# Patient Record
Sex: MALE | Race: WHITE | NOT HISPANIC OR LATINO | Employment: FULL TIME | URBAN - METROPOLITAN AREA
[De-identification: names, ages, dates, MRNs, and addresses within clinical notes are randomized per-mention and may not be internally consistent; named-entity substitution may affect disease eponyms.]

---

## 2016-12-29 ASSESSMENT — ENCOUNTER SYMPTOMS: ARTHRALGIAS: 1

## 2017-01-12 ENCOUNTER — OFFICE VISIT (OUTPATIENT)
Dept: UROLOGY | Facility: CLINIC | Age: 47
End: 2017-01-12

## 2017-01-12 VITALS
HEIGHT: 77 IN | BODY MASS INDEX: 20.78 KG/M2 | SYSTOLIC BLOOD PRESSURE: 127 MMHG | WEIGHT: 176 LBS | DIASTOLIC BLOOD PRESSURE: 84 MMHG | HEART RATE: 60 BPM

## 2017-01-12 DIAGNOSIS — Z30.09 ENCOUNTER FOR VASECTOMY COUNSELING: Primary | ICD-10-CM

## 2017-01-12 ASSESSMENT — PAIN SCALES - GENERAL: PAINLEVEL: NO PAIN (0)

## 2017-01-12 NOTE — PROGRESS NOTES
"CC: Desires sterilization, consult for vasectomy.  New patient, self referred..    HPI: Kevyn Art is a 46 year old male with no children, and he is intersted in getting a vasectomy for sterilization.      No voiding problems.  No history of  trauma.  No hematuria  Normal sexual function.  No history of bleeding problems.    PMH:   Past Medical History   Diagnosis Date     Atrial fibrillation with RVR (H)    resolved a-fib.    No history of surgery.     FAMILY HX:   Family History   Problem Relation Age of Onset     Breast Cancer Mother 62      No history of coagulopathies, no  malignancies.     ALLERGIES:      Allergies   Allergen Reactions     No Clinical Screening - See Comments      Hay Fever      Diltiazem Rash       MEDS:   Current Outpatient Prescriptions   Medication Sig     aspirin 325 MG tablet Take 1 tablet by mouth daily.     No current facility-administered medications for this visit.       SOCIAL HISTORY:  Social History   Substance Use Topics     Smoking status: Never Smoker      Smokeless tobacco: Never Used     Alcohol Use: Yes      Comment: Social        REVIEW OF SYMPTOMS:   Denies testicular pain, ED, ejaculatory problems, rashes in the groin, easy bruising or bleeding.   No constitutional, eye, ENT, heart, lung, GI, musculoskeletal, skin, neurologic, psychiatric, endocrine or hematologic complaints.       GENERAL PHYSICAL EXAM:   /84 mmHg  Pulse 60  Ht 1.956 m (6' 5\")  Wt 79.833 kg (176 lb)  BMI 20.87 kg/m2     Constitutional: No acute distress. Well nourished.   PSYCH: Normal mood and affect.   NEURO: Normal gait, no focal deficits.   EYES: Anicteric, EOMI, PERR  CARDIOPULMONARY: Breathing non-labored, pulse regular, no peripheral edema.   GI: Abdomen soft, non-tender, surgical scars: none,  no organomegaly.  MUSCULOSKELETAL: Normal limb proportions, no muscle wasting, no contractures.    SKIN: Normal virilized hair distribution, no lesions, warts or rashes over genitalia, " abdomen extremities or face.   HEME/LYMPH: No echymosis, no lymphadenopathy in groin, no lymphedema.     EXAM:  Phallus  circumcised, meatus adequate, no plaques palpated.   Testes descended, consistency is normal. No intra-testicular masses.  Epididymes present.  Vas present bilateraly, both very  easy to find.  CUONG deferred.        I discussed with him at length the risks and benefits of the procedure. He understands that this is a sterilization procedure, and not reversible contraception. He understands that reversals, while possible, are not guaranteed to work and fairly complex. I discussed with him the option of sperm cryopreservation.     I stressed that he continues to be fertile in the post-operative period, and that he should continue using other contraceptive methods, such as a condom, until he obtains a semen analysis and we review the results to confirm success of the procedure and infertility. I also stressed to him that recanalization and pregnancy can occur in about 1 per thousand cases, possibly more even after we clear him with a semen analysis showing no motile sperm. I counseled him on the john-operative risks of bleeding, infection, pain.  I described to him post-vasectomy pain syndrome that can occur in about 1 to 2% of men undergoing vasectomy.     We also discussed recovery times (typically days if no complications) and post-operative care including use of ice packs, pain medication and wound care.    He will think about the above and schedule the procedure if he decides to proceed.

## 2017-01-12 NOTE — PATIENT INSTRUCTIONS
Please follow up with Dr Chavez for your vasectomy. Please stop taking ibuprofen/aspirin 5 days prior and shave your scrotum the night before the procedure     It was a pleasure meeting with you today.  Thank you for allowing me and my team the privilege of caring for you today.  YOU are the reason we are here, and I truly hope we provided you with the excellent service you deserve.  Please let us know if there is anything else we can do for you so that we can be sure you are leaving completely satisfied with your care experience.

## 2017-01-12 NOTE — MR AVS SNAPSHOT
After Visit Summary   1/12/2017    Kevyn Art    MRN: [id number]           Patient Information     Date Of Birth          1970        Visit Information        Provider Department      1/12/2017 7:20 AM Jay Chavez MD [facility]        Today's Diagnoses     Encounter for vasectomy counseling    -  1       Care Instructions    Please follow up with Dr Chavez for your vasectomy. Please stop taking ibuprofen/aspirin 5 days prior and shave your scrotum the night before the procedure     It was a pleasure meeting with you today.  Thank you for allowing me and my team the privilege of caring for you today.  YOU are the reason we are here, and I truly hope we provided you with the excellent service you deserve.  Please let us know if there is anything else we can do for you so that we can be sure you are leaving completely satisfied with your care experience.                Follow-ups after your visit        Follow-up notes from your care team     Return if symptoms worsen or fail to improve.      Your next 10 appointments already scheduled     Feb 02, 2017 10:00 AM   (Arrive by 9:45 AM)   Vasectomy with Jay Chavez MD   [facility] ([facility])    [address] [place] [id number] [phone]              Who to contact     Please call your clinic at [phone] to:    Ask questions about your health    Make or cancel appointments    Discuss your medicines    Learn about your test results    Speak to your doctor   If you have compliments or concerns about an experience at your clinic, or if you wish to file a complaint, please contact [facility] Patient Relations at [phone] or email us at [email or web address]         Additional Information About Your Visit        MyChart Information     MyChart  "gives you secure access to your electronic health record. If you see a primary care provider, you can also send messages to your care team and make appointments. If you have questions, please call your primary care clinic.  If you do not have a primary care provider, please call [phone] and they will assist you.      Scuttledog is an electronic gateway that provides easy, online access to your medical records. With Scuttledog, you can request a clinic appointment, read your test results, renew a prescription or communicate with your care team.     To access your existing account, please contact your [facility] or call [phone] for assistance.        Care EveryWhere ID     This is your Care EveryWhere ID. This could be used by other organizations to access your [place] medical records  [id number]        Your Vitals Were     Pulse Height BMI (Body Mass Index)             60 1.956 m (6' 5\") 20.87 kg/m2          Blood Pressure from Last 3 Encounters:   01/12/17 127/84   02/05/16 132/88   08/19/15 114/77    Weight from Last 3 Encounters:   01/12/17 79.833 kg (176 lb)   02/05/16 81.647 kg (180 lb)   08/19/15 79.833 kg (176 lb)              Today, you had the following     No orders found for display       Primary Care Provider Office Phone # Fax #    Julio César Pia De La Rosa [phone]       [address] [address]        Thank you!     Thank you for choosing [facility] UROLOGIC CANCERS  for your care. Our goal is always to provide you with excellent care. Hearing back from our patients is one way we can continue to improve our services. Please take a few minutes to complete the written survey that you may receive in the mail after your visit with us. Thank you!             Your Updated Medication List - Protect others around you: Learn how to safely use, store and throw away your medicines at " www.disposemymeds.org.          This list is accurate as of: 1/12/17  7:40 AM.  Always use your most recent med list.                   Brand Name Dispense Instructions for use    aspirin 325 MG tablet     100 tablet    Take 1 tablet by mouth daily.

## 2017-01-26 ENCOUNTER — PRE VISIT (OUTPATIENT)
Dept: UROLOGY | Facility: CLINIC | Age: 47
End: 2017-01-26

## 2017-01-26 NOTE — TELEPHONE ENCOUNTER
Left message with patient about vasectomy. No ibuprofen/asiprin 5 days prior and shave scrotum the night before. instructed patient to call if they have questions

## 2017-02-02 ENCOUNTER — OFFICE VISIT (OUTPATIENT)
Dept: UROLOGY | Facility: CLINIC | Age: 47
End: 2017-02-02

## 2017-02-02 VITALS
HEART RATE: 67 BPM | SYSTOLIC BLOOD PRESSURE: 110 MMHG | WEIGHT: 170 LBS | HEIGHT: 77 IN | BODY MASS INDEX: 20.07 KG/M2 | DIASTOLIC BLOOD PRESSURE: 82 MMHG

## 2017-02-02 DIAGNOSIS — Z98.52 STATUS POST VASECTOMY: Primary | ICD-10-CM

## 2017-02-02 RX ORDER — ACETAMINOPHEN AND CODEINE PHOSPHATE 300; 30 MG/1; MG/1
1-2 TABLET ORAL EVERY 4 HOURS PRN
Qty: 20 TABLET | Refills: 0 | Status: SHIPPED | OUTPATIENT
Start: 2017-02-02 | End: 2017-07-22

## 2017-02-02 ASSESSMENT — PAIN SCALES - GENERAL
PAINLEVEL: NO PAIN (0)
PAINLEVEL: NO PAIN (0)

## 2017-02-02 NOTE — PATIENT INSTRUCTIONS
Please complete semen analysis in 12 weeks. Call [phone] to schedule    It was a pleasure meeting with you today.  Thank you for allowing me and my team the privilege of caring for you today.  YOU are the reason we are here, and I truly hope we provided you with the excellent service you deserve.  Please let us know if there is anything else we can do for you so that we can be sure you are leaving completely satisfied with your care experience.

## 2017-02-02 NOTE — NURSING NOTE
Chief Complaint   Patient presents with     Vasectomy     desire for sterilization     Invasive Procedure Safety Checklist:    Procedure: vasectomy    Action: Complete sections and checkboxes as appropriate.    Pre-procedure:  1. Patient ID Verified with 2 identifiers (Trudy and  or MRN) : YES    2. Procedure and site verified with patient/designee (when able) : YES    3. Accurate consent documentation in medical record : YES    4. H&P (or appropriate assessment) documented in medical record : NO  H&P must be up to 30 days prior to procedure an updated within 24 hours of                 Procedure as applicable.     5. Relevant diagnostic and radiology test results appropriately labeled and displayed as applicable : NO    6. Blood products, implants, devices, and/or special equipment available for the procedure as applicable : NO    7. Procedure site(s) marked with provider initials [Exclusions: none] : NO    8. Marking not required. Reason : Yes  Procedure does not require site marking    Time Out:     Time-Out performed immediately prior to starting procedure, including verbal and active participation of all team members addressing: YES    1. Correct patient identity.  2. Confirmed that the correct side and site are marked.  3. An accurate procedure to be done.  4. Agreement on the procedure to be done.  5. Correct patient position.  6. Relevant images and results are properly labeled and appropriately displayed.  7. The need to administer antibiotics or fluids for irrigation purposes during the procedure as applicable.  8. Safety precautions based on patient history or medication use.    During Procedure: Verification of correct person, site, and procedure occurs any time the responsibility for care of the patient is transferred to another member of the care team.    Sky JOHNSON

## 2017-02-02 NOTE — PROGRESS NOTES
Procedure: Vasectomy bilateral.   Anesthesia: Local lidocaine injection by surgeon.  Surgeon: JOSH Chavez M.D.   Assistant:  None, Lora BEY present    Description of procedure: After the patient received education material regarding vasectomy, including risks and benefits, we proceeded with obtaining consent and proceeded with the surgery. He understands that there is a risk of late failure from recanalization. He understands that he is fertile until he has completed one or more semen analyses and he is given clearance from us for unprotected intercourse.  He understands that we will contact regarding the results but it is his responsibility to make sure he is cleared before having unprotected intercourse.  I have discussed with him other risks including bleeding, infection, acute and possible long-term pain.    The right vas was ligated first.  This was done using the standard technique by grasping it with a three-finger  and lifting it up to the skin.  A small amount of lidocaine was infiltrated into the skin and vasal sheath.  The skin was punctured and dilated bluntly using the scalpel-less dissector.  The sheath was identified and a rigid clamp was passed around the vas which was then lifted out of the incision.  The vas was cleaned off from the deferential vessels and the sheath, and cautery was used to divide the vas between mosquitos.  A small segment was removed and discarded.  The lumen of the vas was cannulated to confirm its identity, and the lumens of both ends were cauterized.  Pen cautery was used sparingly/as needed for minor bleeders.  A facial interposition was then performed with a single suture of 4-0 chromic. The skin defect was closed with a 4-0 chromic interrupted suture after confirming adequate hemostasis.       We then turned our attention to the left side and a similar technique was performed. This involved division, excision of a segment, cautery of the lumens, and fascial  interposition.    There were no hematomas noted at the end of the procedure.  The patient tolerated the procedure well.    He was advised to return for a post vasectomy semen check in 3 months, and that he is not sterile and must continue to use contraception until we tell him otherwise (based on follow-up semen specimen(s)).    Plan:  -Post vasectomy semen analysis in 3 months   -ice packs to scrotum X 24h  -keep incision dry X 48h  -Rx for  Tylenol #3

## 2017-02-02 NOTE — Clinical Note
2/2/2017       RE: Kevyn Art  [address] [facility] [address]     Dear Colleague,    Thank you for referring your patient, Kevyn Art, to the [facility] AND UROLOGIC CANCERS at [facility]. Please see a copy of my visit note below.    Procedure: Vasectomy bilateral.   Anesthesia: Local lidocaine injection by surgeon.  Surgeon: JOSH Chavez M.D.   Assistant:  None, Lora BEY present    Description of procedure: After the patient received education material regarding vasectomy, including risks and benefits, we proceeded with obtaining consent and proceeded with the surgery. He understands that there is a risk of late failure from recanalization. He understands that he is fertile until he has completed one or more semen analyses and he is given clearance from us for unprotected intercourse.  He understands that we will contact regarding the results but it is his responsibility to make sure he is cleared before having unprotected intercourse.  I have discussed with him other risks including bleeding, infection, acute and possible long-term pain.    The right vas was ligated first.  This was done using the standard technique by grasping it with a three-finger  and lifting it up to the skin.  A small amount of lidocaine was infiltrated into the skin and vasal sheath.  The skin was punctured and dilated bluntly using the scalpel-less dissector.  The sheath was identified and a rigid clamp was passed around the vas which was then lifted out of the incision.  The vas was cleaned off from the deferential vessels and the sheath, and cautery was used to divide the vas between mosquitos.  A small segment was removed and discarded.  The lumen of the vas was cannulated to confirm its identity, and the lumens of both ends were cauterized.  Pen cautery was used sparingly/as needed for minor bleeders.  A facial interposition was then performed  with a single suture of 4-0 chromic. The skin defect was closed with a 4-0 chromic interrupted suture after confirming adequate hemostasis.       We then turned our attention to the left side and a similar technique was performed. This involved division, excision of a segment, cautery of the lumens, and fascial interposition.    There were no hematomas noted at the end of the procedure.  The patient tolerated the procedure well.    He was advised to return for a post vasectomy semen check in 3 months, and that he is not sterile and must continue to use contraception until we tell him otherwise (based on follow-up semen specimen(s)).    Plan:  -Post vasectomy semen analysis in 3 months   -ice packs to scrotum X 24h  -keep incision dry X 48h  -Rx for  Tylenol #3      Again, thank you for allowing me to participate in the care of your patient.      Sincerely,    Jay Chavez MD

## 2017-03-30 DIAGNOSIS — Z98.52 STATUS POST VASECTOMY: ICD-10-CM

## 2017-03-30 LAB
ABSTINENCE DAYS: 8 DAYS (ref 2–7)
AGGLUTINATION: NORMAL YES/NO
ANALYSIS TEMP - CENTIGRADE: 23 CENTIGRADE
CELL FRAGMENTS: NORMAL %
COLLECTION METHOD: NORMAL
COLLECTION SITE: NORMAL
CONSENT TO RELEASE TO PARTNER: YES
IMMATURE SPERM: NORMAL %
IMMOTILE: 0 %
LAB RECEIPT TIME: NORMAL
LIQUEFIED: YES YES/NO
NON-PROGRESSIVE MOTILITY: 0 %
PROGRESSIVE MOTILITY: 0 % (ref 32–?)
ROUND CELLS: 0 MILLION/ML (ref ?–2)
SPECIMEN CONCENTRATION: 0 MILLION/ML (ref 15–?)
SPECIMEN PH: 7.6 PH (ref 7.2–?)
SPECIMEN TYPE: NORMAL
SPECIMEN VOL UR: 1.5 ML (ref 1.5–?)
TIME OF ANALYSIS: NORMAL
TOTAL NUMBER: 0 MILLION (ref 39–?)
TOTAL PROGRESSIVE MOTILE: 0 MILLION (ref 15.6–?)
VISCOUS: YES YES/NO
WBC SPECIMEN: NORMAL %

## 2017-03-30 PROCEDURE — 89321 SEMEN ANAL SPERM DETECTION: CPT

## 2017-03-31 NOTE — PROGRESS NOTES
Dear Kevyn,     You are cleared for intercourse post-vasectomy.    Your semen analysis showed zero sperm.    Thank You!   Let me know if you have any questions.    Tameka CLAUDIO

## 2017-06-02 ENCOUNTER — OFFICE VISIT (OUTPATIENT)
Dept: URGENT CARE | Facility: URGENT CARE | Age: 47
End: 2017-06-02
Payer: COMMERCIAL

## 2017-06-02 VITALS
OXYGEN SATURATION: 98 % | DIASTOLIC BLOOD PRESSURE: 60 MMHG | HEIGHT: 77 IN | TEMPERATURE: 98.5 F | SYSTOLIC BLOOD PRESSURE: 116 MMHG | BODY MASS INDEX: 20.66 KG/M2 | WEIGHT: 175 LBS | RESPIRATION RATE: 15 BRPM | HEART RATE: 80 BPM
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
## 2017-06-02 PROCEDURE — 99213 OFFICE O/P EST LOW 20 MIN: CPT | Performed by: PHYSICIAN ASSISTANT

## 2017-06-02 RX ORDER — AZITHROMYCIN 250 MG/1
TABLET, FILM COATED ORAL
Qty: 6 TABLET | Refills: 0 | Status: SHIPPED | OUTPATIENT
Start: 2017-06-02 | End: 2017-07-22

## 2017-06-02 NOTE — NURSING NOTE
"Chief Complaint   Patient presents with     Urgent Care     Derm Problem     scratched by his cat this morning, nearl left eye. concern of infection        Initial /60  Pulse 80  Temp 98.5  F (36.9  C) (Oral)  Resp 15  Ht 6' 5\" (1.956 m)  Wt 175 lb (79.4 kg)  SpO2 98%  BMI 20.75 kg/m2 Estimated body mass index is 20.75 kg/(m^2) as calculated from the following:    Height as of this encounter: 6' 5\" (1.956 m).    Weight as of this encounter: 175 lb (79.4 kg).  Medication Reconciliation: complete  "

## 2017-06-02 NOTE — PROGRESS NOTES
"SUBJECTIVE:    Kevyn Art is a 46 year old male who presents today for cat scratch    Nursing Notes:   Amalia Crow, [facility]  6/2/2017  5:18 PM  Signed  Chief Complaint   Patient presents with     Urgent Care     Derm Problem     scratched by his cat this morning, nearl left eye. concern of infection        -was sleeping with his cat next to him when the cat got angry about another cat outside of the window and \"attacked\" this pt.    -he was scratched on the face, over the left eye, and on the nose.    -he has been using sterile dressings on the areas and took a shower right after.    -since the injury this morning, he had noticed pain at the bridge of the nose, has noticed swelling along the nose as well    -pt denies any pain in the eye, no photophobia and no FB sensation    OBJECTIVE:  /60  Pulse 80  Temp 98.5  F (36.9  C) (Oral)  Resp 15  Ht 6' 5\" (1.956 m)  Wt 175 lb (79.4 kg)  SpO2 98%  BMI 20.75 kg/m2    General:  Kevyn is awake, alert, and cooperative.  NAD.  Skin/face:  Obvious facial trauma with superficial scratches to the right cheek.  These are linear and without drainage or swelling.  Above the left eye, along the superior eyelid, there is a small flap laceration without active bleeding.  Some swelling, mild.  No drainage.  This area is slightly tender to palpation.  Bridge of the nose with small 4mm superficial scratch and surrounding edema which is tender.  Swelling extends to the pt's left orbital border.    Eyes: PERRLA, EOMI.  No conjunctival injection    Assessment/Plan:    (T14.8,  W55.03XA) Cat scratch  (primary encounter diagnosis)  Comment:   -discussed low threshold for going to the ED for this type of an injury.  Will start azithromycin (recommended per Up To Date) today  -advised ibuprofen for pain  -if there is ANY sign of fever, worsening swelling or concern for worsening in general, proceed to ED immediately  -f/u with PCP Monday otherwise  Plan: azithromycin " (ZITHROMAX) 250 MG tablet

## 2017-06-02 NOTE — MR AVS SNAPSHOT
"              After Visit Summary   6/2/2017    Kevyn Art    MRN: [id number]           Patient Information     Date Of Birth          1970        Visit Information        Provider Department      6/2/2017 5:15 PM Love Mckinney PA-C [facility]        Today's Diagnoses     Cat scratch    -  1       Follow-ups after your visit        Who to contact     If you have questions or need follow up information about today's clinic visit or your schedule please contact [facility] directly at [phone].  Normal or non-critical lab and imaging results will be communicated to you by [employer], letter or phone within 4 business days after the clinic has received the results. If you do not hear from us within 7 days, please contact the clinic through [employer] or phone. If you have a critical or abnormal lab result, we will notify you by phone as soon as possible.  Submit refill requests through [employer] or call your pharmacy and they will forward the refill request to us. Please allow 3 business days for your refill to be completed.          Additional Information About Your Visit        MyChart Information     Construct gives you secure access to your electronic health record. If you see a primary care provider, you can also send messages to your care team and make appointments. If you have questions, please call your primary care clinic.  If you do not have a primary care provider, please call [phone] and they will assist you.        Care EveryWhere ID     This is your Care EveryWhere ID. This could be used by other organizations to access your [place] medical records  [id number]        Your Vitals Were     Pulse Temperature Respirations Height Pulse Oximetry BMI (Body Mass Index)    80 98.5  F (36.9  C) (Oral) 15 6' 5\" (1.956 m) 98% 20.75 kg/m2       Blood Pressure from Last 3 Encounters:   06/02/17 116/60   02/02/17 110/82   01/12/17 127/84    Weight " from Last 3 Encounters:   06/02/17 175 lb (79.4 kg)   02/02/17 170 lb (77.1 kg)   01/12/17 176 lb (79.8 kg)              Today, you had the following     No orders found for display         Today's Medication Changes          These changes are accurate as of: 6/2/17  5:51 PM.  If you have any questions, ask your nurse or doctor.               Start taking these medicines.        Dose/Directions    azithromycin 250 MG tablet   Commonly known as:  ZITHROMAX   Used for:  Cat scratch   Started by:  Love Mckinney PA-C        Two tablets first day, then one tablet daily for four days.   Quantity:  6 tablet   Refills:  0            Where to get your medicines      These medications were sent to [facility] - [place], [place] - [address]  [address], [place] [address]     Phone:  [phone]     azithromycin 250 MG tablet                Primary Care Provider Office Phone # Fax #    Julio César Pia De La Rosa [phone]       [address] [address]        Thank you!     Thank you for choosing [facility]  for your care. Our goal is always to provide you with excellent care. Hearing back from our patients is one way we can continue to improve our services. Please take a few minutes to complete the written survey that you may receive in the mail after your visit with us. Thank you!             Your Updated Medication List - Protect others around you: Learn how to safely use, store and throw away your medicines at www.disposemymeds.org.          This list is accurate as of: 6/2/17  5:51 PM.  Always use your most recent med list.                   Brand Name Dispense Instructions for use    * acetaminophen-codeine 300-30 MG per tablet    TYLENOL #3    30 tablet    Take 1-2 tablets by mouth every 4 hours as needed for pain maximum 6 tablet(s) per day       * acetaminophen-codeine 300-30 MG per tablet     TYLENOL/codeine #3    20 tablet    Take 1-2 tablets by mouth every 4 hours as needed for mild pain       aspirin 325 MG tablet     100 tablet    Take 1 tablet by mouth daily.       azithromycin 250 MG tablet    ZITHROMAX    6 tablet    Two tablets first day, then one tablet daily for four days.       * Notice:  This list has 2 medication(s) that are the same as other medications prescribed for you. Read the directions carefully, and ask your doctor or other care provider to review them with you.

## 2017-07-22 ENCOUNTER — HOSPITAL ENCOUNTER (EMERGENCY)
Facility: CLINIC | Age: 47
Discharge: HOME | End: 2017-07-22
Attending: FAMILY MEDICINE | Admitting: FAMILY MEDICINE
Payer: COMMERCIAL

## 2017-07-22 ENCOUNTER — NURSE TRIAGE (OUTPATIENT)
Dept: NURSING | Facility: CLINIC | Age: 47
End: 2017-07-22

## 2017-07-22 VITALS
DIASTOLIC BLOOD PRESSURE: 90 MMHG | OXYGEN SATURATION: 98 % | HEART RATE: 57 BPM | TEMPERATURE: 97.8 F | WEIGHT: 169.38 LBS | RESPIRATION RATE: 16 BRPM | SYSTOLIC BLOOD PRESSURE: 129 MMHG | BODY MASS INDEX: 20.08 KG/M2
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
## 2017-07-22 DIAGNOSIS — S61.251A OPEN BITE OF LEFT INDEX FINGER WITHOUT DAMAGE TO NAIL, INITIAL ENCOUNTER: ICD-10-CM

## 2017-07-22 DIAGNOSIS — S61.258A CAT BITE OF INDEX FINGER, INITIAL ENCOUNTER: ICD-10-CM

## 2017-07-22 DIAGNOSIS — S50.812A CAT SCRATCH OF LEFT FOREARM, INITIAL ENCOUNTER: ICD-10-CM
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
## 2017-07-22 PROCEDURE — 25000132 ZZH RX MED GY IP 250 OP 250 PS 637: Performed by: FAMILY MEDICINE

## 2017-07-22 PROCEDURE — 99284 EMERGENCY DEPT VISIT MOD MDM: CPT | Mod: GC | Performed by: FAMILY MEDICINE

## 2017-07-22 PROCEDURE — 99283 EMERGENCY DEPT VISIT LOW MDM: CPT | Performed by: FAMILY MEDICINE

## 2017-07-22 RX ADMIN — AMOXICILLIN AND CLAVULANATE POTASSIUM 1 TABLET: 875; 125 TABLET, FILM COATED ORAL at 09:51

## 2017-07-22 ASSESSMENT — ENCOUNTER SYMPTOMS
TROUBLE SWALLOWING: 0
HEADACHES: 0
WOUND: 1
NERVOUS/ANXIOUS: 0
PALPITATIONS: 0
CONFUSION: 0
CHILLS: 0
FEVER: 0
AGITATION: 0
ADENOPATHY: 0
VOMITING: 0
NAUSEA: 0
SHORTNESS OF BREATH: 0
CHEST TIGHTNESS: 0

## 2017-07-22 NOTE — TELEPHONE ENCOUNTER
"  Reason for Disposition    [1] Cut (length > 1/8 inch or 3 mm) or skin tear AND[2] any animal     \"My cat bit me this morning and I'm not sure if I need stitches or not\".    Additional Information    Negative: [1] Major bleeding (e.g., actively dripping or spurting) AND [2] can't be stopped    Negative: Sounds like a life-threatening emergency to the triager    Negative: Snake bite    Negative: Bite, wound, or sting from fish    Negative: [1] Any break in skin (e.g., cut, puncture or scratch) AND[2] wild animal at risk for RABIES (e.g., bat, raccoon, caputo, skunk, coyote, other carnivores)    Negative: [1] Any break in skin (e.g., cut, puncture or scratch) AND[2] dog, cat, or ferret at risk for RABIES (e.g., sick, stray, unprovoked bite, developing country)    Negative: [1] Any break in skin (e.g., cut, puncture or scratch) AND[2] monkey    Protocols used: ANIMAL BITE-ADULT-      Tala Matamoros RN  [facility]      "

## 2017-07-22 NOTE — DISCHARGE INSTRUCTIONS
Please make an appointment to follow up with Your Primary Care Provider in 7 days if not improving.    Return to the ED with fever, chills, severe pain, redness, and swelling in left hand/arm, or pus coming from wound.        Cat Bite    A cat bite can cause a wound deep enough to break the skin. In such cases, the wound is cleaned and then closed. Sometimes, the wound is not closed completely. This is so that fluid can drain if the wound becomes infected. In addition to wound care, a tetanus shot may be given, if needed.  Home Care    Wash your hands well with soap and warm water before and after caring for the wound. This helps lower the risk of infection.    Care for the wound as directed. If a dressing was applied to the wound, be sure to change it as directed.    If the wound bleeds, place a clean, soft cloth on the wound. Then firmly apply pressure until the bleeding stops. This may take up to 5 minutes. Do not release the pressure and look at the wound during this time.    Most wounds heal within 10 days. But an infection can occur even with proper treatment. So be sure to check the wound daily for signs of infection (see below).    Antibiotics may be prescribed. These help prevent or treat infection. If you re given antibiotics, take them as directed. Also be sure to complete the medications.  Rabies Prevention  Rabies is a virus that can be carried in certain animals. These can include domestic animals such as cats and dogs. Pets fully vaccinated against rabies (2 shots) are at very low risk of infection. But because human rabies is almost always fatal, any biting pet should be confined for 10 days as an extra precaution. In general, if there is a risk for rabies, the following steps may need to be taken:    If someone s pet cat has bitten you, it should be kept in a secure area for the next 10 days to watch for signs of illness. (If the pet owner won t allow this, contact your local animal control center.)  If the cat becomes ill or dies during that time, contact your local animal control center at once so the animal may be tested for rabies. If the cat stays healthy for the next 10 days, there is no danger of rabies in the animal or you.    If a stray cat bit you, contact your local animal control center. They can give information on capture, quarantine, and animal rabies testing.    If you can t locate the animal that bit you in the next 2 days, and if rabies exists in your region, you may need to receive the rabies vaccine series. Call your health care provider right away. Or, return to the emergency department promptly.    All animal bites should be reported to the local animal control center. If you were not given a form to fill out, you can report this yourself.  Follow-up care  Follow up with your health care provider, or as directed.  When to seek medical advice  Call your health care provider right away if any of these occur:    Signs of infection:    Spreading redness or warmth from the wound    Increased pain or swelling    Fever of 100.4 F (38 C) or higher, or as directed by your health care provider    Colored fluid or pus draining from the wound    Signs of rabies infection:    Headache    Confusion    Strange behavior    Seizure    Decreased ability to move any body part near the bite area    Bleeding that cannot be stopped after 5 minutes of firm pressure  Date Last Reviewed: 3/23/2015    4663-3695 [employer]. [address] [place] [address], [place] [address]. All rights reserved. This information is not intended as a substitute for professional medical care. Always follow your healthcare professional's instructions.

## 2017-07-22 NOTE — ED AVS SNAPSHOT
[facility], [place], Emergency Department    [address]    [facility] [address]    Phone:  [phone]    Fax:  [phone]                                       Kevyn Art   MRN: [id number]    Department:  [facility], Emergency Department   Date of Visit:  7/22/2017           After Visit Summary Signature Page     I have received my discharge instructions, and my questions have been answered. I have discussed any challenges I see with this plan with the nurse or doctor.    ..........................................................................................................................................  Patient/Patient Representative Signature      ..........................................................................................................................................  Patient Representative Print Name and Relationship to Patient    ..................................................               ................................................  Date                                            Time    ..........................................................................................................................................  Reviewed by Signature/Title    ...................................................              ..............................................  Date                                                            Time

## 2017-07-22 NOTE — ED PROVIDER NOTES
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 Patients own cat was on lap and when cat saw another cat outside, went to jump up and was restrained by owner and then cat bit patient on left index fingertip involving tip of nail. Also has long scratches on left inner wrist area. Cat had all shots.      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  Bite happened around 7:30am this morning.  This is his third bite from this cat.  His cat saw another cat outside the window, tried to lunge at it, and the patient attempted to restrain the cat.  The cat bit his left index finger and scratched his left forearm.  He washed it under cold water and came to the ED.  He has no fever, chills, minimal throbbing pain, and swelling at this point.  Cat is up to date with all shots, specifically rabies.  Patient has no past medical history of liver dysfunction or asplenia and is current with his tetanus vaccine (2013).    I have reviewed the Medications, Allergies, Past Medical and Surgical History, and Social History in the Epic system.    Review of Systems   Constitutional: Negative for chills and fever.   HENT: Negative for trouble swallowing.    Eyes: Negative for visual disturbance.   Respiratory: Negative for chest tightness and shortness of breath.    Cardiovascular: Negative for chest pain and palpitations.   Gastrointestinal: Negative for nausea and vomiting.   Skin: Positive for wound (left index finger and forearm). Negative for pallor.   Neurological: Negative for headaches.   Hematological: Negative for adenopathy.   Psychiatric/Behavioral: Negative for agitation and confusion. The patient is not nervous/anxious.    All other systems reviewed and are negative.      Physical Exam   BP: (!) 135/95  Heart Rate:  77  Temp: 97.5  F (36.4  C)  Resp: 16  Weight: 76.8 kg (169 lb 6 oz)  SpO2: 100 %  Physical Exam   Constitutional: He is oriented to person, place, and time. He appears well-developed and well-nourished.   HENT:   Head: Normocephalic and atraumatic.   Eyes: Conjunctivae are normal.   Neck: Normal range of motion.   Cardiovascular: Normal rate.    Pulmonary/Chest: Effort normal.   Neurological: He is alert and oriented to person, place, and time.   Skin:        Left index finger has circumferential laceration along half of the nail around the tip of phalanx. Skin well approximated.  Bleeding stopped, minimal swelling, and pain.  Left forearm has 4 linear abrasions due to a cat scratch.  No lymphadenopathy noted.   Psychiatric: He has a normal mood and affect. His behavior is normal. Thought content normal.       ED Course     ED Course   Irrigated laceration and abrasions under cold water for 5 minutes each.  First dose of Augmentin given at 10am.  Steristrips and loose dressing applied.    Procedures            Critical Care time:  none               Labs Ordered and Resulted from Time of ED Arrival Up to the Time of Departure from the ED - No data to display         Assessments & Plan (with Medical Decision Making)   Kevyn Art is a 46 year old male with a laceration of the second phalanx above the DIP joint due to an acute cat bite as well as 4 linear abrasions on his left inner forearm from a cat scratch.  Vital signs stable.  No fever, chills, red streaking of the arm, pus draining from wound, ;ymphadenopathy, or other signs of infection at this time.  Wounds irrigated for 5 minutes under cold water.  No foreign body located.  First dose of amoxicillin-clavulanate 875-125 mg given PO at 10am.  Steristrips and loose dressing applied.  Infection precautions given: return to ED with red streaking down arm, pus coming from wound, swelling or erythema of finger, hand, or arm, lymphadenopathy, or signs of  systemic infection of fever and chills.  Instructed to change dressing when several times today.  Keep finger and arm wounds clean and dry.  Able to remove covering when scab has formed.    I have reviewed the nursing notes.    I have reviewed the findings, diagnosis, plan and need for follow up with the patient.    New Prescriptions    AMOXICILLIN-CLAVULANATE (AUGMENTIN) 875-125 MG PER TABLET    Take 1 tablet by mouth 2 times daily       Final diagnoses:   Cat bite of index finger, initial encounter   Cat scratch of left forearm, initial encounter     I SAW THIS PT INDEPENDENTLY OF   RESIDENT DOCTOR TERESSA.  I FULLY AGREE WITH THE EXAM AND TREATMENT PLAN. IT WAS FULLY DISCUSSED.  HERB MOCK  7/22/2017   [facility], [place], EMERGENCY DEPARTMENT    Cherelle Nguyen, FP1     Jesse [place] MD  08/02/17 [id number]

## 2017-07-22 NOTE — ED AVS SNAPSHOT
[facility], Emergency Department    [address]    [place] [address]    Phone:  [phone]    Fax:  [phone]                                       Kevyn Art   MRN: [id number]    Department:  [facility], Emergency Department   Date of Visit:  7/22/2017           Patient Information     Date Of Birth          1970        Your diagnoses for this visit were:     Cat bite of index finger, initial encounter     Cat scratch of left forearm, initial encounter        You were seen by Jesse [place] MD.        Discharge Instructions       Please make an appointment to follow up with Your Primary Care Provider in 7 days if not improving.    Return to the ED with fever, chills, severe pain, redness, and swelling in left hand/arm, or pus coming from wound.        Cat Bite    A cat bite can cause a wound deep enough to break the skin. In such cases, the wound is cleaned and then closed. Sometimes, the wound is not closed completely. This is so that fluid can drain if the wound becomes infected. In addition to wound care, a tetanus shot may be given, if needed.  Home Care    Wash your hands well with soap and warm water before and after caring for the wound. This helps lower the risk of infection.    Care for the wound as directed. If a dressing was applied to the wound, be sure to change it as directed.    If the wound bleeds, place a clean, soft cloth on the wound. Then firmly apply pressure until the bleeding stops. This may take up to 5 minutes. Do not release the pressure and look at the wound during this time.    Most wounds heal within 10 days. But an infection can occur even with proper treatment. So be sure to check the wound daily for signs of infection (see below).    Antibiotics may be prescribed. These help prevent or treat infection. If you re given antibiotics, take them as directed. Also be sure to complete the medications.  Rabies Prevention  Rabies is a virus that can be carried  in certain animals. These can include domestic animals such as cats and dogs. Pets fully vaccinated against rabies (2 shots) are at very low risk of infection. But because human rabies is almost always fatal, any biting pet should be confined for 10 days as an extra precaution. In general, if there is a risk for rabies, the following steps may need to be taken:    If someone s pet cat has bitten you, it should be kept in a secure area for the next 10 days to watch for signs of illness. (If the pet owner won t allow this, contact your local animal control center.) If the cat becomes ill or dies during that time, contact your local animal control center at once so the animal may be tested for rabies. If the cat stays healthy for the next 10 days, there is no danger of rabies in the animal or you.    If a stray cat bit you, contact your local animal control center. They can give information on capture, quarantine, and animal rabies testing.    If you can t locate the animal that bit you in the next 2 days, and if rabies exists in your region, you may need to receive the rabies vaccine series. Call your health care provider right away. Or, return to the emergency department promptly.    All animal bites should be reported to the local animal control center. If you were not given a form to fill out, you can report this yourself.  Follow-up care  Follow up with your health care provider, or as directed.  When to seek medical advice  Call your health care provider right away if any of these occur:    Signs of infection:    Spreading redness or warmth from the wound    Increased pain or swelling    Fever of 100.4 F (38 C) or higher, or as directed by your health care provider    Colored fluid or pus draining from the wound    Signs of rabies infection:    Headache    Confusion    Strange behavior    Seizure    Decreased ability to move any body part near the bite area    Bleeding that cannot be stopped after 5 minutes of  firm pressure  Date Last Reviewed: 3/23/2015    2612-9519 [employer]. [address] [place] [address], [place] [address]. All rights reserved. This information is not intended as a substitute for professional medical care. Always follow your healthcare professional's instructions.            24 Hour Appointment Hotline       To make an appointment at any [facility] clinic, call [phone] ([phone]). If you don't have a family doctor or clinic, we will help you find one. [facility] are conveniently located to serve the needs of you and your family.             Review of your medicines      START taking        Dose / Directions Last dose taken    amoxicillin-clavulanate 875-125 MG per tablet   Commonly known as:  AUGMENTIN   Dose:  1 tablet   Quantity:  13 tablet        Take 1 tablet by mouth 2 times daily   Refills:  0                Prescriptions were sent or printed at these locations (1 Prescription)                   Other Prescriptions                Printed at Department/Unit printer (1 of 1)         amoxicillin-clavulanate (AUGMENTIN) 875-125 MG per tablet                Orders Needing Specimen Collection     None      Pending Results     No orders found from 7/20/2017 to 7/23/2017.            Pending Culture Results     No orders found from 7/20/2017 to 7/23/2017.            Pending Results Instructions     If you had any lab results that were not finalized at the time of your Discharge, you can call the ED Lab Result RN at [phone]. You will be contacted by this team for any positive Lab results or changes in treatment. The nurses are available 7 days a week from 10A to 6:30P.  You can leave a message 24 hours per day and they will return your call.        Thank you for choosing [facility]       Thank you for choosing [facility] for your care. Our goal is always to provide you with excellent care. Hearing back from our patients is one way we can continue to improve our services. Please  take a few minutes to complete the written survey that you may receive in the mail after you visit with us. Thank you!        EpicPledge Information     [employer] gives you secure access to your electronic health record. If you see a primary care provider, you can also send messages to your care team and make appointments. If you have questions, please call your primary care clinic.  If you do not have a primary care provider, please call [phone] and they will assist you.        Care EveryWhere ID     This is your Care EveryWhere ID. This could be used by other organizations to access your [facility] medical records  [id number]        Equal Access to Services     [facility] Center[facility] : Kristian Moraes, aleyda cast, delia palomares, laura metcalf . So [facility] [phone].    ATENCIÓN: Si habla español, tiene a  disposición servicios gratuitos de asistencia lingüística. Llame al [phone].    We comply with applicable federal civil rights laws and [place] laws. We do not discriminate on the basis of race, color, national origin, age, disability sex, sexual orientation or gender identity.            After Visit Summary       This is your record. Keep this with you and show to your community pharmacist(s) and doctor(s) at your next visit.

## 2017-09-20 ENCOUNTER — OFFICE VISIT (OUTPATIENT)
Dept: FAMILY MEDICINE | Facility: CLINIC | Age: 47
End: 2017-09-20
Payer: COMMERCIAL

## 2017-09-20 VITALS — DIASTOLIC BLOOD PRESSURE: 80 MMHG | TEMPERATURE: 98 F | SYSTOLIC BLOOD PRESSURE: 120 MMHG

## 2017-09-20 DIAGNOSIS — Z23 NEED FOR VACCINATION: ICD-10-CM

## 2017-09-20 DIAGNOSIS — Z71.84 TRAVEL ADVICE ENCOUNTER: Primary | ICD-10-CM

## 2017-09-20 PROCEDURE — 90717 YELLOW FEVER VACCINE SUBQ: CPT | Mod: GA | Performed by: NURSE PRACTITIONER

## 2017-09-20 PROCEDURE — 90471 IMMUNIZATION ADMIN: CPT | Mod: GA | Performed by: NURSE PRACTITIONER

## 2017-09-20 PROCEDURE — 90734 MENACWYD/MENACWYCRM VACC IM: CPT | Mod: GA | Performed by: NURSE PRACTITIONER

## 2017-09-20 PROCEDURE — 90686 IIV4 VACC NO PRSV 0.5 ML IM: CPT | Mod: GA | Performed by: NURSE PRACTITIONER

## 2017-09-20 PROCEDURE — 90636 HEP A/HEP B VACC ADULT IM: CPT | Mod: GA | Performed by: NURSE PRACTITIONER

## 2017-09-20 PROCEDURE — 90472 IMMUNIZATION ADMIN EACH ADD: CPT | Mod: GA | Performed by: NURSE PRACTITIONER

## 2017-09-20 PROCEDURE — 99403 PREV MED CNSL INDIV APPRX 45: CPT | Mod: 25 | Performed by: NURSE PRACTITIONER

## 2017-09-20 RX ORDER — CIPROFLOXACIN 500 MG/1
500 TABLET, FILM COATED ORAL 2 TIMES DAILY
Qty: 6 TABLET | Refills: 0 | Status: SHIPPED | OUTPATIENT
Start: 2017-09-20 | End: 2020-04-23

## 2017-09-20 RX ORDER — ATOVAQUONE AND PROGUANIL HYDROCHLORIDE 250; 100 MG/1; MG/1
1 TABLET, FILM COATED ORAL DAILY
Qty: 30 TABLET | Refills: 0 | Status: SHIPPED | OUTPATIENT
Start: 2017-09-20 | End: 2020-04-23

## 2017-09-20 RX ORDER — ACETAZOLAMIDE 125 MG/1
TABLET ORAL
Qty: 20 TABLET | Refills: 0 | Status: SHIPPED | OUTPATIENT
Start: 2017-09-20 | End: 2020-04-23

## 2017-09-20 NOTE — NURSING NOTE
"Chief Complaint   Patient presents with     [facility]     initial /80  Temp 98  F (36.7  C) (Oral) Estimated body mass index is 20.08 kg/(m^2) as calculated from the following:    Height as of 6/2/17: 6' 5\" (1.956 m).    Weight as of 7/22/17: 169 lb 6 oz (76.8 kg).  BP completed using cuff size: regular.  L  arm      Health Maintenance that is potentially due pending provider review:  NONE    n/a    Sagar Sierra ma  "

## 2017-09-20 NOTE — PATIENT INSTRUCTIONS
Today September 20, 2017 you received the    Twinrix (Hepatitis A & B combo) Vaccine - Please return on 10/20/17 for your 2nd dose and 3/19/18 or later for your 3rd and final dose.    Yellow Fever (YF)    Meningococcal (Menactra) Vaccine    FLU SHOT    Typhoid - Oral. This prescription has been faxed to your pharmacy, please take as directed.   .      10/20/2017    Twin Rene  MMR      Rabies if decided  Days 0,7,21- or 28    These appointments can be made as a NURSE ONLY visit.    **It is very important for the vaccinations to be given on the scheduled day(s), this helps ensure you receive the full effectiveness of the vaccine.**    Please call [facility] with any questions [phone]    Thank you for visiting [facility]

## 2017-09-20 NOTE — MR AVS SNAPSHOT
After Visit Summary   9/20/2017    Kevyn Art    MRN: [id number]           Patient Information     Date Of Birth          1970        Visit Information        Provider Department      9/20/2017 2:15 PM Sharon Christianson APRN CNP [facility]        Today's Diagnoses     Travel advice encounter    -  1    Need for vaccination          Care Instructions    Today September 20, 2017 you received the    Twinrix (Hepatitis A & B combo) Vaccine - Please return on 10/20/17 for your 2nd dose and 3/19/18 or later for your 3rd and final dose.    Yellow Fever (YF)    Meningococcal (Menactra) Vaccine    FLU SHOT    Typhoid - Oral. This prescription has been faxed to your pharmacy, please take as directed.   .      10/20/2017    Twin Rene  MMR      Rabies if decided  Days 0,7,21- or 28    These appointments can be made as a NURSE ONLY visit.    **It is very important for the vaccinations to be given on the scheduled day(s), this helps ensure you receive the full effectiveness of the vaccine.**    Please call [facility] with any questions [phone]    Thank you for visiting [facility]              Follow-ups after your visit        Future tests that were ordered for you today     Open Standing Orders        Priority Remaining Interval Expires Ordered    HEPA/HEPB VACCINE ADULT IM Routine 2/3  8/20/2018 9/20/2017    RABIES VACCINE, IM Routine 3/3  9/20/2018 9/20/2017          Open Future Orders        Priority Expected Expires Ordered    MMR VIRUS IMMUNIZATION, SUBCUT Routine 10/20/2017 9/20/2018 9/20/2017            Who to contact     If you have questions or need follow up information about today's clinic visit or your schedule please contact [facility] directly at [phone].  Normal or non-critical lab and imaging results will be communicated to you by MyChart, letter or phone within 4 business days after the clinic has received the  results. If you do not hear from us within 7 days, please contact the clinic through [employer] or phone. If you have a critical or abnormal lab result, we will notify you by phone as soon as possible.  Submit refill requests through [employer] or call your pharmacy and they will forward the refill request to us. Please allow 3 business days for your refill to be completed.          Additional Information About Your Visit        [employer] Information     [employer] gives you secure access to your electronic health record. If you see a primary care provider, you can also send messages to your care team and make appointments. If you have questions, please call your primary care clinic.  If you do not have a primary care provider, please call [phone] and they will assist you.        Care EveryWhere ID     This is your Care EveryWhere ID. This could be used by other organizations to access your [place] medical records  [id number]        Your Vitals Were     Temperature                   98  F (36.7  C) (Oral)            Blood Pressure from Last 3 Encounters:   09/20/17 120/80   07/22/17 129/90   06/02/17 116/60    Weight from Last 3 Encounters:   07/22/17 169 lb 6 oz (76.8 kg)   06/02/17 175 lb (79.4 kg)   02/02/17 170 lb (77.1 kg)              We Performed the Following     C YELLOW FEVER IMMUNIZATION, LIVE, SQ (STAMARIL)     HC FLU VAC PRESRV FREE QUAD SPLIT VIR 3+YRS IM     MENINGOCOCCAL VACCINE,IM (MENACTRA)          Today's Medication Changes          These changes are accurate as of: 9/20/17  3:51 PM.  If you have any questions, ask your nurse or doctor.               Start taking these medicines.        Dose/Directions    acetaZOLAMIDE 125 MG tablet   Commonly known as:  DIAMOX   Used for:  Travel advice encounter   Started by:  Sharon Christianson, KAVON CNP        Take one- two tab every 12 hours starting 24 hours prior to ascent and continue until decent   Quantity:  20 tablet   Refills:  0        atovaquone-proguanil 250-100 MG per tablet   Commonly known as:  MALARONE   Used for:  Travel advice encounter   Started by:  Sharon Christianson APRN CNP        Dose:  1 tablet   Take 1 tablet by mouth daily Start 2 days before exposure to Malaria and continue daily till  7 days after exposure.   Quantity:  30 tablet   Refills:  0       ciprofloxacin 500 MG tablet   Commonly known as:  CIPRO   Used for:  Travel advice encounter   Started by:  Sharon Christianson APRN CNP        Dose:  500 mg   Take 1 tablet (500 mg) by mouth 2 times daily Take 1 tablet two times a day for up to 3 days for severe diarrhea during travel.   Quantity:  6 tablet   Refills:  0       typhoid CR capsule   Commonly known as:  VIVOTIF   Used for:  Need for vaccination, Travel advice encounter   Started by:  Sharon Christianson APRN CNP        Dose:  1 capsule   Take 1 capsule by mouth every other day   Quantity:  4 capsule   Refills:  0            Where to get your medicines      These medications were sent to [facility] [address] [place] [id number]    Hours:  24-hours Phone:  [phone]     acetaZOLAMIDE 125 MG tablet    atovaquone-proguanil 250-100 MG per tablet    ciprofloxacin 500 MG tablet    typhoid CR capsule                Primary Care Provider Office Phone # Fax #    Julio César Pia De La Rosa [phone] [address] [place] [address]        Equal Access to Services     AHSAN NEW AH: Hadii maren ku hadasho Soomaali, waaxda luqadaha, qaybta kaalmada adeegyada, laura kelsey. So [facility] [phone].    ATENCIÓN: Si habla español, tiene a  disposición servicios gratuitos de asistencia lingüística. Llalexandre al [phone].    We comply with applicable federal civil rights laws and [place] laws. We do not discriminate on the basis of race, color, national origin, age, disability  sex, sexual orientation or gender identity.            Thank you!     Thank you for choosing [facility]  for your care. Our goal is always to provide you with excellent care. Hearing back from our patients is one way we can continue to improve our services. Please take a few minutes to complete the written survey that you may receive in the mail after your visit with us. Thank you!             Your Updated Medication List - Protect others around you: Learn how to safely use, store and throw away your medicines at www.disposemymeds.org.          This list is accurate as of: 9/20/17  3:51 PM.  Always use your most recent med list.                   Brand Name Dispense Instructions for use Diagnosis    acetaZOLAMIDE 125 MG tablet    DIAMOX    20 tablet    Take one- two tab every 12 hours starting 24 hours prior to ascent and continue until decent    Travel advice encounter       atovaquone-proguanil 250-100 MG per tablet    MALARONE    30 tablet    Take 1 tablet by mouth daily Start 2 days before exposure to Malaria and continue daily till  7 days after exposure.    Travel advice encounter       ciprofloxacin 500 MG tablet    CIPRO    6 tablet    Take 1 tablet (500 mg) by mouth 2 times daily Take 1 tablet two times a day for up to 3 days for severe diarrhea during travel.    Travel advice encounter       typhoid CR capsule    VIVOTIF    4 capsule    Take 1 capsule by mouth every other day    Need for vaccination, Travel advice encounter

## 2017-09-20 NOTE — PROGRESS NOTES
Nurse Note      Itinerary:  [facility]      Departure Date: 1/1/18      Return Date: 1/19/18      Length of Trip 18 days      Reason for Travel: Tourism           Urban or rural: both      Accommodations: [facility] (tent)        IMMUNIZATION HISTORY  Have you received any immunizations within the past 4 weeks?  No  Have you ever fainted from having your blood drawn or from an injection?  No  Have you ever had a fever reaction to vaccination?  No  Have you ever had any bad reaction or side effect from any vaccination?  No  Have you ever had hepatitis A or B vaccine?  No  Do you live (or work closely) with anyone who has AIDS, an AIDS-like condition, any other immune disorder or who is on chemotherapy for cancer?  No  Do you have a family history of immunodeficiency?  No  Have you received any injection of immune globulin or any blood products during the past 12 months?  No    Patient roomed by Sagar Merchant  Kevyn Art is a 47 year old male seen today alone for counsultation for international travel to [facility] for Tourism.  Patient will be departing in  5 month(s) and staying for   19 day(s) and  traveling with friends.      Patient itinerary :  will be in the urban and rural region of including a climb on [facility] with sleeping elevations of 12,000 ft max .  Also will be in Malaria risk [place] of [place] and [place] which presents risk for Malaria, Yellow Fever, Dengue Fever, Chikungungya, Zika,  Trypanosomiasis, Schistosomiasis, Rabies, Ebola, food borne illnesses, motor vehicle accidents, Typhoid, Leishmaniasis and Lassa Fever. exposure.      Patient's activities will include sightseeing, safari/game rodriguez, hiking, beach activities (salt water) and high altitude exposure.    Patient's country of birth is [place]    Special medical concerns: none  Pre-travel questionnaire was completed by patient and reviewed by provider.     Vitals: /80  Temp 98  F (36.7  C)  (Oral)  BMI= There is no height or weight on file to calculate BMI.    EXAM:  General:  Well-nourished, well-developed in no acute distress.  Appears to be stated age, interacts appropriately and expresses understanding of information given to patient.    No current outpatient prescriptions on file.     Patient Active Problem List   Diagnosis     Atrial fibrillation (H)     CARDIOVASCULAR SCREENING; LDL GOAL LESS THAN 160     Alcohol use     Allergies   Allergen Reactions     No Clinical Screening - See Comments      Hay Fever      Zithromax [Azithromycin]      depression     Diltiazem Rash         Immunizations discussed include:   Hepatitis A:  Twin Rene series started today  Hepatitis B: Twin Rene series started today  Influenza: Ordered/given today, risks, benefits and side effects reviewed  Typhoid: Oral Typhoid (Vivotiff) Rx sent to pharmacy  Rabies: future order placed and patient will research further  Yellow Fever: Stamaril Ordered/given today - consent completed, side effects, precautions, allergies, risks discussed. Patient expressed understanding.  Lithuanian Encephalitis: Not indicated  Meningococcus: Ordered/given today, risks, benefits and side effects reviewed  Tetanus/Diphtheria: Up to date  Measles/Mumps/Rubella: future order placed for 1 month from today  Cholera: Not needed  Polio: Up to date  Pneumococcal: Under age of 65  Varicella: Immune by disease history per patient report  Zostavax:  Not indicated  HPV:  Not indicated  TB:  Low risk itinerary    Stamaril Informed Consent    The patient was provided with a copy of the IRB-approved consent form and all questions were answered before the patient agreed to participate by signing the informed consent document.   A copy of the form was provided to the patient.    Date: 9/20/2017  Consent Version Date: 5/10/2017  Consent Obtained by:  Sofia  HIPAA:  Yes  HIPAA Authorization Signed Date: 9/20/2017      Inclusion/Exclusion Criteria:    (Similar to  Yellow Fever-VAX)      The patient met all of the following inclusion criteria in order to be eligible for the Stamaril vaccination under this EAP (Expanded Access Investigational New Drug Program)           At increased risk for YF, including researchers, laboratory workers, vaccine production staff, and those who are traveling within 30 days to a YF-endemic region or to a country requiring proof of YF vaccination under IHRs (International Health Regulations)?       Yes     Patient is greater than or equal to 9 months of age on the day of vaccination?     Yes     Patient is greater than or equal to 18 years of age and signed and dated the Consent Forms?     Yes     Patient is < 18 years of age and parent(s)/guardian(s) signed and dated the Consent Forms?      Patient is 7 years to < 18 years of age and signed and dated the Assent form?        No Assent is required.  Patient is <7 years of age.     na      No      N/A     The patient did not meet any of the following criteria that would have excluded the patient from receiving the Stamaril vaccination under this EAP              Patient is less than 9 months of age.       No     The patient is breast-feeding and cannot stop nursing for at least 14 days after vaccination.    Note: Yellow Fever vaccine virus may be transmissible via breast milk by nursing mothers who are vaccinated during the final 2 weeks of pregnancy or post-partum.   Following transmission, infants may develop encephalitis.  The minimum time of discontinuation of breastfeeding for 14 days after vaccination is based on the expected clearance of live-attenuated vaccine virus.       No     The patient is immunosuppressed, whether congenital or idiopathic, including for example, leukemia, lymphoma, other malignancies, and patients who are receiving immunosuppressant medications (e.g. Systemic corticosteroids [greater than the standard dose of topical or inhaled steroids], alkylating drugs,  antimetabolites, of other cytotoxic or immunomodulatory drugs) or radiation therapy or organ transplantation.       No     The patient has known hypersensitivity to the active substance or to any of the excipients of Stamaril vaccine or to eggs or chicken proteins.     No     The patient is symptomatic for human immunodeficiency virus (HIV) infection     No     The patient is asymptomatic for HIV infection but accompanied by evidence of severe immune suppression    Note:  Evidence of severe immune suppression includes CD4+ T-cell counts < 200 cubic millimeters (or < 15% total lymphocytes in children aged < 6 years), or as determined by the health care provider.       Yes     The patient has a history of thymus dysfunction (including myasthenia gravis, thymoma, thymectomy)     No     Moderate or severe febrile illness or acute illness    Note: Participation in the EAP can be reassessed when moderate or severe febrile illness or acute illness has resolved.       No         Altitude Exposure on this trip: YES  Past tolerance to Altitude: Limited ( 9,000)    ASSESSMENT/PLAN:    ICD-10-CM    1. Travel advice encounter Z71.89 C YELLOW FEVER IMMUNIZATION, LIVE, SQ (STAMARIL)     HC FLU VAC PRESRV FREE QUAD SPLIT VIR 3+YRS IM     HEPA/HEPB VACCINE ADULT IM     MMR VIRUS IMMUNIZATION, SUBCUT     RABIES VACCINE, IM     MENINGOCOCCAL VACCINE,IM (MENACTRA)     typhoid (VIVOTIF) CR capsule     HEPA/HEPB VACCINE ADULT IM     atovaquone-proguanil (MALARONE) 250-100 MG per tablet     acetaZOLAMIDE (DIAMOX) 125 MG tablet     ciprofloxacin (CIPRO) 500 MG tablet   2. Need for vaccination Z23 C YELLOW FEVER IMMUNIZATION, LIVE, SQ (STAMARIL)     HC FLU VAC PRESRV FREE QUAD SPLIT VIR 3+YRS IM     HEPA/HEPB VACCINE ADULT IM     MMR VIRUS IMMUNIZATION, SUBCUT     RABIES VACCINE, IM     MENINGOCOCCAL VACCINE,IM (MENACTRA)     typhoid (VIVOTIF) CR capsule     HEPA/HEPB VACCINE ADULT IM     I have reviewed general recommendations for safe  travel   including: food/water precautions, insect precautions, safer sex   practices given high prevalence of Zika, HIV and other STDs,   roadway safety. Educational materials and [employer] report provided.    Malaraia prophylaxis recommended: Malarone  Symptomatic treatment for traveler's diarrhea: ciprofloxacin (allergic to Zithro)  Altitude illness prevention and treatment: Diamox with patient instructions on altitude illness prevention and early recognition      Evacuation insurance advised and resources were provided to patient.    Total visit time 45 minutes  with over 50% of time spent counseling patient as detailed above.    Sharon Christianson CNP

## 2017-11-09 ENCOUNTER — ALLIED HEALTH/NURSE VISIT (OUTPATIENT)
Dept: NURSING | Facility: CLINIC | Age: 47
End: 2017-11-09
Payer: COMMERCIAL

## 2017-11-09 DIAGNOSIS — Z71.84 TRAVEL ADVICE ENCOUNTER: ICD-10-CM

## 2017-11-09 DIAGNOSIS — Z23 NEED FOR VACCINATION: ICD-10-CM

## 2017-11-09 PROCEDURE — 99207 ZZC NO CHARGE NURSE ONLY: CPT

## 2017-11-09 PROCEDURE — 90707 MMR VACCINE SC: CPT | Mod: GA

## 2017-11-09 PROCEDURE — 90636 HEP A/HEP B VACC ADULT IM: CPT | Mod: GA

## 2017-11-09 NOTE — NURSING NOTE
Chief Complaint   Patient presents with     Allied Health Visit     Screening Questionnaire for Adult Immunization    Are you sick today?   No   Do you have allergies to medications, food, a vaccine component or latex?   Yes-documented   Have you ever had a serious reaction after receiving a vaccination?   No   Do you have a long-term health problem with heart disease, lung disease, asthma, kidney disease, metabolic disease (e.g. diabetes), anemia, or other blood disorder?   No   Do you have cancer, leukemia, HIV/AIDS, or any other immune system problem?   No   In the past 3 months, have you taken medications that affect  your immune system, such as prednisone, other steroids, or anticancer drugs; drugs for the treatment of rheumatoid arthritis, Crohn s disease, or psoriasis; or have you had radiation treatments?   No   Have you had a seizure, or a brain or other nervous system problem?   No   During the past year, have you received a transfusion of blood or blood     products, or been given immune (gamma) globulin or antiviral drug?   No   For women: Are you pregnant or is there a chance you could become        pregnant during the next month?   No   Have you received any vaccinations in the past 4 weeks?   Yes-travel vaccines     Immunization questionnaire was positive for at least one answer.  Sharon Christianson CNP aware.        Per orders of Sharon Christianson CNP, injection of Twinrix and MMR given by Camilla Gamino. Patient instructed to remain in clinic for 15 minutes afterwards, and to report any adverse reaction to me immediately.       Screening performed by Camilla Gamino on 11/9/2017 at 2:09 PM.

## 2017-11-09 NOTE — MR AVS SNAPSHOT
After Visit Summary   11/9/2017    Kevyn Art    MRN: [id number]           Patient Information     Date Of Birth          1970        Visit Information        Provider Department      11/9/2017 1:00 PM UP NURSE [place] Nurse        Today's Diagnoses     Need for vaccination        Travel advice encounter           Follow-ups after your visit        Your next 10 appointments already scheduled     Mar 09, 2018  1:00 PM CST   Nurse Only with UP NURSE   [place] Nurse ([facility])    [address]  [facility] [address] [phone]              Who to contact     If you have questions or need follow up information about today's clinic visit or your schedule please contact [facility] directly at [phone].  Normal or non-critical lab and imaging results will be communicated to you by MyChart, letter or phone within 4 business days after the clinic has received the results. If you do not hear from us within 7 days, please contact the clinic through [employer] or phone. If you have a critical or abnormal lab result, we will notify you by phone as soon as possible.  Submit refill requests through [email or web address] or call your pharmacy and they will forward the refill request to us. Please allow 3 business days for your refill to be completed.          Additional Information About Your Visit        MyChart Information     [email or web address] gives you secure access to your electronic health record. If you see a primary care provider, you can also send messages to your care team and make appointments. If you have questions, please call your primary care clinic.  If you do not have a primary care provider, please call [phone] and they will assist you.        Care EveryWhere ID     This is your Care EveryWhere ID. This could be used by other organizations to access your [place] medical records  [id number]         Blood Pressure from Last 3 Encounters:    09/20/17 120/80   07/22/17 129/90   06/02/17 116/60    Weight from Last 3 Encounters:   07/22/17 169 lb 6 oz (76.8 kg)   06/02/17 175 lb (79.4 kg)   02/02/17 170 lb (77.1 kg)              We Performed the Following     HEPA/HEPB VACCINE ADULT IM     MMR VIRUS IMMUNIZATION, SUBCUT        Primary Care Provider Office Phone # Fax #    Julio César Pia De La Rosa [phone] [address] [place] [address]        Equal Access to Services     [facility]: Hadii aad ku hadasho Somari, waaxda luqadaha, qaybta kaalmajarad palomares, laura metcalf . So [facility] [phone].    ATENCIÓN: Si habla español, tiene a  disposición servicios gratuitos de asistencia lingüística. [facility] [phone].    We comply with applicable federal civil rights laws and [place] laws. We do not discriminate on the basis of race, color, national origin, age, disability, sex, sexual orientation, or gender identity.            Thank you!     Thank you for choosing [facility]  for your care. Our goal is always to provide you with excellent care. Hearing back from our patients is one way we can continue to improve our services. Please take a few minutes to complete the written survey that you may receive in the mail after your visit with us. Thank you!             Your Updated Medication List - Protect others around you: Learn how to safely use, store and throw away your medicines at www.disposemymeds.org.          This list is accurate as of: 11/9/17  1:44 PM.  Always use your most recent med list.                   Brand Name Dispense Instructions for use Diagnosis    acetaZOLAMIDE 125 MG tablet    DIAMOX    20 tablet    Take one- two tab every 12 hours starting 24 hours prior to ascent and continue until decent    Travel advice encounter       atovaquone-proguanil 250-100 MG per tablet    MALARONE    30 tablet    Take 1 tablet by mouth daily Start 2 days before exposure to  Malaria and continue daily till  7 days after exposure.    Travel advice encounter       ciprofloxacin 500 MG tablet    CIPRO    6 tablet    Take 1 tablet (500 mg) by mouth 2 times daily Take 1 tablet two times a day for up to 3 days for severe diarrhea during travel.    Travel advice encounter       typhoid CR capsule    VIVOTIF    4 capsule    Take 1 capsule by mouth every other day    Need for vaccination, Travel advice encounter

## 2017-12-12 ENCOUNTER — TELEPHONE (OUTPATIENT)
Dept: FAMILY MEDICINE | Facility: CLINIC | Age: 47
End: 2017-12-12

## 2017-12-12 DIAGNOSIS — Z23 NEED FOR VACCINATION: ICD-10-CM

## 2017-12-12 DIAGNOSIS — Z71.84 TRAVEL ADVICE ENCOUNTER: Primary | ICD-10-CM

## 2017-12-12 NOTE — TELEPHONE ENCOUNTER
Reason for Call: Request for an order or referral:    Order or referral being requested: thphoid pt decided he wants to do they shot instead of oral    Date needed: as soon as possible    Has the patient been seen by the PCP for this problem? YES    Additional comments:     Phone number Patient can be reached at:  Home number on file [phone] (home)    Best Time:  any    Can we leave a detailed message on this number?  YES    Call taken on 12/12/2017 at 11:12 AM by Sheila Crow

## 2017-12-12 NOTE — TELEPHONE ENCOUNTER
Patient did not receive Oral Typhoid from pharmacy and would now like a shot.  Typhoid IM ordered and patient will call to schedule a nurse visit.  Sharon Christianson CNP

## 2017-12-18 ENCOUNTER — ALLIED HEALTH/NURSE VISIT (OUTPATIENT)
Dept: NURSING | Facility: CLINIC | Age: 47
End: 2017-12-18
Payer: COMMERCIAL

## 2017-12-18 DIAGNOSIS — Z23 NEED FOR VACCINATION: ICD-10-CM

## 2017-12-18 PROCEDURE — 90471 IMMUNIZATION ADMIN: CPT | Mod: GA

## 2017-12-18 PROCEDURE — 99207 ZZC NO CHARGE LOS: CPT

## 2017-12-18 PROCEDURE — 90691 TYPHOID VACCINE IM: CPT | Mod: GA

## 2017-12-18 NOTE — PROGRESS NOTES
Screening Questionnaire for Adult Immunization    Are you sick today?   No   Do you have allergies to medications, food, a vaccine component or latex?   No   Have you ever had a serious reaction after receiving a vaccination?   No   Do you have a long-term health problem with heart disease, lung disease, asthma, kidney disease, metabolic disease (e.g. diabetes), anemia, or other blood disorder?   No   Do you have cancer, leukemia, HIV/AIDS, or any other immune system problem?   No   In the past 3 months, have you taken medications that affect  your immune system, such as prednisone, other steroids, or anticancer drugs; drugs for the treatment of rheumatoid arthritis, Crohn s disease, or psoriasis; or have you had radiation treatments?   No   Have you had a seizure, or a brain or other nervous system problem?   No   During the past year, have you received a transfusion of blood or blood     products, or been given immune (gamma) globulin or antiviral drug?   No   For women: Are you pregnant or is there a chance you could become        pregnant during the next month?   No   Have you received any vaccinations in the past 4 weeks?   No     Immunization questionnaire answers were all negative.      Prior to injection verified patient identity using patient's name and date of birth.    Per orders of VILMA Christianson, injection of Typhoid given by Natty Shanks. Patient instructed to remain in clinic for 15 minutes afterwards, and to report any adverse reaction to me immediately.       Screening performed by Natty Shanks on 12/18/2017 at 9:19 AM.

## 2017-12-18 NOTE — NURSING NOTE
"Chief Complaint   Patient presents with     Allied Health Visit     Imm/Inj     Typhoid      There were no vitals taken for this visit. Estimated body mass index is 20.08 kg/(m^2) as calculated from the following:    Height as of 6/2/17: 6' 5\" (1.956 m).    Weight as of 7/22/17: 169 lb 6 oz (76.8 kg).  bp completed using cuff size: NA (Not Taken)       Health Maintenance addressed:  NONE    n/a    Natty Shanks MA     "

## 2017-12-18 NOTE — MR AVS SNAPSHOT
After Visit Summary   12/18/2017    Kevyn Art    MRN: [id number]           Patient Information     Date Of Birth          1970        Visit Information        Provider Department      12/18/2017 9:00 AM UP NURSE [facility]        Today's Diagnoses     Need for vaccination           Follow-ups after your visit        Your next 10 appointments already scheduled     Mar 09, 2018  1:00 PM CST   Nurse Only with UP NURSE   [facility] ([facility])    [address] [place]  [facility] [address] [phone]              Who to contact     If you have questions or need follow up information about today's clinic visit or your schedule please contact [facility] directly at [phone].  Normal or non-critical lab and imaging results will be communicated to you by MyChart, letter or phone within 4 business days after the clinic has received the results. If you do not hear from us within 7 days, please contact the clinic through MyChart or phone. If you have a critical or abnormal lab result, we will notify you by phone as soon as possible.  Submit refill requests through [email or web address] or call your pharmacy and they will forward the refill request to us. Please allow 3 business days for your refill to be completed.          Additional Information About Your Visit        MyChart Information     [email or web address] gives you secure access to your electronic health record. If you see a primary care provider, you can also send messages to your care team and make appointments. If you have questions, please call your primary care clinic.  If you do not have a primary care provider, please call [phone] and they will assist you.        Care EveryWhere ID     This is your Care EveryWhere ID. This could be used by other organizations to access your [place] medical records  [id number]         Blood Pressure from Last 3 Encounters:   09/20/17 120/80   07/22/17 129/90    06/02/17 116/60    Weight from Last 3 Encounters:   07/22/17 169 lb 6 oz (76.8 kg)   06/02/17 175 lb (79.4 kg)   02/02/17 170 lb (77.1 kg)              We Performed the Following     TYPHOID VACCINE, IM        Primary Care Provider Office Phone # Fax #    Julio César Pia De La Rosa [phone] [address] [place] [address]        Equal Access to Services     AHSAN NEW : Hadii aad ku hadasho Soomaali, waaxda luqadaha, qaybta kaalmada adeegyada, waxay idiin hayaan adeeg kharafab labelinda kelsey. So [facility] [phone].    ATENCIÓN: Si habla español, tiene a  disposición servicios gratuitos de asistencia lingüística. [facility] [phone].    We comply with applicable federal civil rights laws and [place] laws. We do not discriminate on the basis of race, color, national origin, age, disability, sex, sexual orientation, or gender identity.            Thank you!     Thank you for choosing [facility] NURSE  for your care. Our goal is always to provide you with excellent care. Hearing back from our patients is one way we can continue to improve our services. Please take a few minutes to complete the written survey that you may receive in the mail after your visit with us. Thank you!             Your Updated Medication List - Protect others around you: Learn how to safely use, store and throw away your medicines at www.disposemymeds.org.          This list is accurate as of: 12/18/17  9:22 AM.  Always use your most recent med list.                   Brand Name Dispense Instructions for use Diagnosis    acetaZOLAMIDE 125 MG tablet    DIAMOX    20 tablet    Take one- two tab every 12 hours starting 24 hours prior to ascent and continue until decent    Travel advice encounter       atovaquone-proguanil 250-100 MG per tablet    MALARONE    30 tablet    Take 1 tablet by mouth daily Start 2 days before exposure to Malaria and continue daily till  7 days after exposure.    Travel advice encounter        ciprofloxacin 500 MG tablet    CIPRO    6 tablet    Take 1 tablet (500 mg) by mouth 2 times daily Take 1 tablet two times a day for up to 3 days for severe diarrhea during travel.    Travel advice encounter       typhoid CR capsule    VIVOTIF    4 capsule    Take 1 capsule by mouth every other day    Need for vaccination, Travel advice encounter

## 2018-07-18 ENCOUNTER — RADIANT APPOINTMENT (OUTPATIENT)
Dept: GENERAL RADIOLOGY | Facility: CLINIC | Age: 48
End: 2018-07-18
Attending: FAMILY MEDICINE
Payer: COMMERCIAL

## 2018-07-18 ENCOUNTER — OFFICE VISIT (OUTPATIENT)
Dept: ORTHOPEDICS | Facility: CLINIC | Age: 48
End: 2018-07-18
Payer: COMMERCIAL

## 2018-07-18 VITALS
DIASTOLIC BLOOD PRESSURE: 81 MMHG | SYSTOLIC BLOOD PRESSURE: 123 MMHG | WEIGHT: 175 LBS | HEIGHT: 77 IN | BODY MASS INDEX: 20.66 KG/M2

## 2018-07-18 DIAGNOSIS — M79.674 PAIN OF TOE OF RIGHT FOOT: Primary | ICD-10-CM

## 2018-07-18 DIAGNOSIS — S92.502A CLOSED FRACTURE OF PHALANX OF LEFT THIRD TOE, INITIAL ENCOUNTER: ICD-10-CM

## 2018-07-18 DIAGNOSIS — M79.674 PAIN OF TOE OF RIGHT FOOT: ICD-10-CM

## 2018-07-18 NOTE — MR AVS SNAPSHOT
"              After Visit Summary   7/18/2018    Kevyn Art    MRN: [id number]           Patient Information     Date Of Birth          1970        Visit Information        Provider Department      7/18/2018 9:10 AM Ruddy Darden [facility]        Today's Diagnoses     Pain of toe of right foot    -  1    Closed fracture of phalanx of left third toe, initial encounter           Follow-ups after your visit        Who to contact     Please call your clinic at [phone] to:    Ask questions about your health    Make or cancel appointments    Discuss your medicines    Learn about your test results    Speak to your doctor            Additional Information About Your Visit        [place] Information     [employer] gives you secure access to your electronic health record. If you see a primary care provider, you can also send messages to your care team and make appointments. If you have questions, please call your primary care clinic.  If you do not have a primary care provider, please call [phone] and they will assist you.      [employer] is an electronic gateway that provides easy, online access to your medical records. With [employer], you can request a clinic appointment, read your test results, renew a prescription or communicate with your care team.     To access your existing account, please contact your [facility] or call [phone] for assistance.        Care EveryWhere ID     This is your Care EveryWhere ID. This could be used by other organizations to access your [place] medical records  [id number]        Your Vitals Were     Height BMI (Body Mass Index)                1.956 m (6' 5\") 20.75 kg/m2           Blood Pressure from Last 3 Encounters:   07/18/18 123/81   09/20/17 120/80   07/22/17 129/90    Weight from Last 3 Encounters:   07/18/18 79.4 kg (175 lb)   07/22/17 76.8 kg (169 lb 6 oz)   06/02/17 79.4 kg (175 lb)         "       Primary Care Provider Office Phone # Fax #    Julio César Pia De La Rosa [phone] [address] [facility] [address]        Equal Access to Services     AHSAN NEW : Kristian garcia sonya Moraes, wamendozada luqadaha, qaybta kaalmada marielle, laura taylorrick kelsey. So [facility] [phone].    ATENCIÓN: Si habla español, tiene a  disposición servicios gratuitos de asistencia lingüística. [facility] [phone].    We comply with applicable federal civil rights laws and [place] laws. We do not discriminate on the basis of race, color, national origin, age, disability, sex, sexual orientation, or gender identity.            Thank you!     Thank you for choosing [facility] SPORTS AND ORTHOPAEDIC WALK IN CLINIC  for your care. Our goal is always to provide you with excellent care. Hearing back from our patients is one way we can continue to improve our services. Please take a few minutes to complete the written survey that you may receive in the mail after your visit with us. Thank you!             Your Updated Medication List - Protect others around you: Learn how to safely use, store and throw away your medicines at www.disposemymeds.org.          This list is accurate as of 7/18/18 11:59 PM.  Always use your most recent med list.                   Brand Name Dispense Instructions for use Diagnosis    acetaZOLAMIDE 125 MG tablet    DIAMOX    20 tablet    Take one- two tab every 12 hours starting 24 hours prior to ascent and continue until decent    Travel advice encounter       atovaquone-proguanil 250-100 MG per tablet    MALARONE    30 tablet    Take 1 tablet by mouth daily Start 2 days before exposure to Malaria and continue daily till  7 days after exposure.    Travel advice encounter       ciprofloxacin 500 MG tablet    CIPRO    6 tablet    Take 1 tablet (500 mg) by mouth 2 times daily Take 1 tablet two times a day for up to 3 days for severe diarrhea during  travel.    Travel advice encounter       typhoid CR capsule    VIVOTIF    4 capsule    Take 1 capsule by mouth every other day    Need for vaccination, Travel advice encounter

## 2018-07-18 NOTE — PROGRESS NOTES
SPORTS & ORTHOPEDIC WALK-IN VISIT 7/18/2018    Primary Care Physician:      Slipped on stairs last night and kicked his foot on a stair. His middle toe on his right foot is now crooked and bruised. He wants to make sure it is not broken.     Reason for visit:     What part of your body is injured / painful?  right middle toe     What caused the injury /pain? Fall    How long ago did your injury occur or pain begin? yesterday    What are your most bothersome symptoms? Pain    How would you characterize your symptom?  sharp    What makes your symptoms better? Rest    What makes your symptoms worse? Standing and Walking    Have you been previously seen for this problem? No    Medical History:    Any recent changes to your medical history? No    Any new medication prescribed since last visit? No    Have you had surgery on this body part before? 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 No    Do you have any vision problems? No    Do you have any chest pain or edema? No    Do you have any shortness of breath or wheezing?  No    Do you have stomach problems? No    Do you have any numbness or focal weakness? No    Do you have diabetes? No    Do you have problems with bleeding or clotting? No    Do you have an rashes or other skin lesions? No

## 2018-07-18 NOTE — PROGRESS NOTES
"NEW PATIENT ENCOUNTER    Chief Complaint: Pain of the Right 3rd Toe       Date of Injury: 7/17/2018    History of Present Illness:   Kevyn Art is a 47 year old, male who presents today for evaluation of right toe injury. The patient reports falling down the stairs last night and kicking his right foot into a spindle. This injury onset pain that is localized in his right third toe. He describes the pain as sharp in nature. Rest alleviates his symptoms and standing or walking exacerbate his symptoms. His right third toe is swollen and is black/blue in color. He denies any previous injuries to his right foot. He voices no further concerns at this time.     Review of Systems:   A 14-point review of systems was obtained and is negative except for as noted in the HPI.     Medications:   Acetazolamide 125 MG tablet 1-2 daily   Atovaquone-proguanil 250-110 MG x1 daily   Ciprofloxacin 500 MG tablet x2 daily   Typhoid CR capsule x1 daily     Allergies:  Zithromax  Diltiazem    Past Medical History:  atrial fibrillation with RVR     Past Surgical History:  No past surgical history on file.     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     Family History:  This patient has a family history of breast cancer.   Negative for bleeding or clotting disorders or adverse reactions to anesthesia.    Physical Examination:  Blood pressure 123/81, height 1.956 m (6' 5\"), weight 79.4 kg (175 lb).    General  - normal appearance, in no obvious distress  CV  - normal pulses at posterior tib and dorsalis pedis  Pulm  - normal respiratory pattern, non-labored  Musculoskeletal - left foot  - stance: normal gait without limp, normal stance without excessive pronation, normal heel inversion with standing heel raise, no obvious leg length discrepancy, normal heel and toe walk  - Right third digit was laterally deviated from proximal phalanx.  Swelling of this toe. Mild ecchymosis.   - palpation: Tenderness at the proximal " phalanx  - ROM: normal active and passive ROM of great and lesser toes, however painful to actively flex 3rd toe.  - strength: 5/5 in all planes  Neuro  - no sensory or motor deficit, grossly normal coordination, normal muscle tone  Skin  - Swelling and ecchymosis at the proximal as the middle phalanx. Small abrasion at the medial aspect of the nail fold. Small ecchymosis of the distal end of the third toe.  Psych  - interactive, appropriate, normal mood and affect    Imaging:   Three views of the right foot were available for review, dated 7/18/2018. These demonstrated oblique fracture of the right third proximal phalanx without intraarticular involvement.     I have independently reviewed the above imaging studies; the results were discussed with the patient.     Assessment:   47 year old, male with oblique fracture of the right third proximal phalanx with mild rotation and lateral deviation.    Diagnosis: Fracture of third toe proximal phalanx    Plan:   An X-ray was obtained in clinic today. I discussed with the patient various treatment options including a still toed shoe or buddy tape. The patient elected to proceed with traction alignment and buddy tape.Declined surgical shoe    Procedure:  Traction rotation applied at the right third digit. Patient declined digital block with anesthesia.  Once stable alignment was obtained, toe was buddy taped to maintain proper alignment. No anesthesia used. Patient tolerated well. He will continue with ice therapy and elevation of the right foot to improve recovery. Follow up as needed.     The documentation recorded by the scribe accurately reflects the services I personally performed and the decisions made by me.      Scribe Disclosure:   I, Jay Glover, am serving as a scribe to document services personally performed by Ruddy Darden DO at this visit, based upon the provider's statements to me. All documentation has been reviewed by the aforementioned provider prior  to being entered into the official medical record.     Portions of this medical record were completed by a scribe. UPON MY REVIEW AND AUTHENTICATION BY ELECTRONIC SIGNATURE, this confirms (a) I performed the applicable clinical services, and (b) the record is accurate.

## 2018-07-18 NOTE — LETTER
"7/18/2018       RE: Kevyn Art  [address] [facility] [address]     Dear Colleague,    Thank you for referring your patient, Kevyn Art, to the [facility] SPORTS AND ORTHOPAEDIC WALK IN CLINIC at [facility]. Please see a copy of my visit note below.    NEW PATIENT ENCOUNTER    Chief Complaint: Pain of the Right 3rd Toe       Date of Injury: 7/17/2018    History of Present Illness:   Kevyn Art is a 47 year old, male who presents today for evaluation of right toe injury. The patient reports falling down the stairs last night and kicking his right foot into a spindle. This injury onset pain that is localized in his right third toe. He describes the pain as sharp in nature. Rest alleviates his symptoms and standing or walking exacerbate his symptoms. His right third toe is swollen and is black/blue in color. He denies any previous injuries to his right foot. He voices no further concerns at this time.     Review of Systems:   A 14-point review of systems was obtained and is negative except for as noted in the HPI.     Medications:   Acetazolamide 125 MG tablet 1-2 daily   Atovaquone-proguanil 250-110 MG x1 daily   Ciprofloxacin 500 MG tablet x2 daily   Typhoid CR capsule x1 daily     Allergies:  Zithromax  Diltiazem    Past Medical History:  atrial fibrillation with RVR     Past Surgical History:  No past surgical history on file.     Social History:  Works as a  at a [employer] shop.     Family History:  This patient has a family history of breast cancer.   Negative for bleeding or clotting disorders or adverse reactions to anesthesia.    Physical Examination:  Blood pressure 123/81, height 1.956 m (6' 5\"), weight 79.4 kg (175 lb).    General  - normal appearance, in no obvious distress  CV  - normal pulses at posterior tib and dorsalis pedis  Pulm  - normal respiratory pattern, non-labored  Musculoskeletal - left foot  - stance: normal " gait without limp, normal stance without excessive pronation, normal heel inversion with standing heel raise, no obvious leg length discrepancy, normal heel and toe walk  - Right third digit was laterally deviated from proximal phalanx.  Swelling of this toe. Mild ecchymosis.   - palpation: Tenderness at the proximal phalanx  - ROM: normal active and passive ROM of great and lesser toes, however painful to actively flex 3rd toe.  - strength: 5/5 in all planes  Neuro  - no sensory or motor deficit, grossly normal coordination, normal muscle tone  Skin  - Swelling and ecchymosis at the proximal as the middle phalanx. Small abrasion at the medial aspect of the nail fold. Small ecchymosis of the distal end of the third toe.  Psych  - interactive, appropriate, normal mood and affect    Imaging:   Three views of the right foot were available for review, dated 7/18/2018. These demonstrated oblique fracture of the right third proximal phalanx without intraarticular involvement.     I have independently reviewed the above imaging studies; the results were discussed with the patient.     Assessment:   47 year old, male with oblique fracture of the right third proximal phalanx with mild rotation and lateral deviation.    Diagnosis: Fracture of third toe proximal phalanx    Plan:   An X-ray was obtained in clinic today. I discussed with the patient various treatment options including a still toed shoe or buddy tape. The patient elected to proceed with traction alignment and buddy tape.Declined surgical shoe    Procedure:  Traction rotation applied at the right third digit. Patient declined digital block with anesthesia.  Once stable alignment was obtained, toe was buddy taped to maintain proper alignment. No anesthesia used. Patient tolerated well. He will continue with ice therapy and elevation of the right foot to improve recovery. Follow up as needed.     The documentation recorded by the scribe accurately reflects the  services I personally performed and the decisions made by me.      Scribe Disclosure:   I, Jay Glover, am serving as a scribe to document services personally performed by Ruddy Darden DO at this visit, based upon the provider's statements to me. All documentation has been reviewed by the aforementioned provider prior to being entered into the official medical record.     Portions of this medical record were completed by a scribe. UPON MY REVIEW AND AUTHENTICATION BY ELECTRONIC SIGNATURE, this confirms (a) I performed the applicable clinical services, and (b) the record is accurate.            SPORTS & ORTHOPEDIC WALK-IN VISIT 7/18/2018    Primary Care Physician:      Slipped on stairs last night and kicked his foot on a stair. His middle toe on his right foot is now crooked and bruised. He wants to make sure it is not broken.     Reason for visit:     What part of your body is injured / painful?  right middle toe     What caused the injury /pain? Fall    How long ago did your injury occur or pain begin? yesterday    What are your most bothersome symptoms? Pain    How would you characterize your symptom?  sharp    What makes your symptoms better? Rest    What makes your symptoms worse? Standing and Walking    Have you been previously seen for this problem? No    Medical History:    Any recent changes to your medical history? No    Any new medication prescribed since last visit? No    Have you had surgery on this body part before? 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 No    Do you have any vision problems? No    Do you have any chest pain or edema? No    Do you have any shortness of breath or wheezing?  No    Do you have stomach problems? No    Do you have any numbness or focal weakness? No    Do you have diabetes? No    Do you have problems with bleeding or clotting?  No    Do you have an rashes or other skin lesions? No         Again, thank you for allowing me to participate in the care of your patient.      Sincerely,    Ruddy Darden, DO

## 2019-11-05 ENCOUNTER — HEALTH MAINTENANCE LETTER (OUTPATIENT)
Age: 49
End: 2019-11-05

## 2020-04-23 ENCOUNTER — NURSE TRIAGE (OUTPATIENT)
Dept: NURSING | Facility: CLINIC | Age: 50
End: 2020-04-23

## 2020-04-23 ENCOUNTER — OFFICE VISIT (OUTPATIENT)
Dept: URGENT CARE | Facility: URGENT CARE | Age: 50
End: 2020-04-23
Payer: COMMERCIAL

## 2020-04-23 VITALS
SYSTOLIC BLOOD PRESSURE: 119 MMHG | WEIGHT: 175 LBS | TEMPERATURE: 98.1 F | BODY MASS INDEX: 20.75 KG/M2 | HEART RATE: 89 BPM | DIASTOLIC BLOOD PRESSURE: 76 MMHG | OXYGEN SATURATION: 97 %
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
## 2020-04-23 DIAGNOSIS — S41.152A CAT BITE OF LEFT UPPER ARM, INITIAL ENCOUNTER: Primary | ICD-10-CM

## 2020-04-23 DIAGNOSIS — L03.114 CELLULITIS OF LEFT UPPER ARM: ICD-10-CM

## 2020-04-23 PROCEDURE — 99214 OFFICE O/P EST MOD 30 MIN: CPT | Mod: 25 | Performed by: NURSE PRACTITIONER

## 2020-04-23 PROCEDURE — 96372 THER/PROPH/DIAG INJ SC/IM: CPT | Performed by: NURSE PRACTITIONER

## 2020-04-23 RX ORDER — CEFTRIAXONE SODIUM 1 G
1 VIAL (EA) INJECTION ONCE
Status: COMPLETED | OUTPATIENT
Start: 2020-04-23 | End: 2020-04-23

## 2020-04-23 RX ADMIN — Medication 1 G: at 20:29

## 2020-04-23 NOTE — TELEPHONE ENCOUNTER
His cat bit him 3x in L elbow earlier today. Cat got scared. His cat has all vaccinations.  No rips or tears but several teeth broke through skin and into tissue. Bled a small amount. He cleansed the wound and applied antibiotic ointment. Reports bite area red and very sore. Advised visit w/ provider w/i 4 hrs. Pt will do On Care visit now.       Disposition/Instructions    Patient to have an OnCare Visit with a provider (Preferred option). Follow System Ambulatory Workflow     To do this follow these instructions:    1. Go to the website [email or web address]  2. Create an account (you will need your insurance information)  3. Start a new visit  4. Choose your diagnosis (e.g. COVID19)  5. Fill out the information about your symptoms  6. A provider will reach out to you by text, phone call or video visit based on your request    Call Back If: Your symptoms worsen before you are able to complete your OnCare Visit with a provider.            Reason for Disposition    [1] Puncture wound (hole through the skin) from claws or teeth AND [2] cat    Additional Information    Negative: [1] Major bleeding (e.g., actively dripping or spurting) AND [2] can't be stopped    Negative: Sounds like a life-threatening emergency to the triager    Negative: Snake bite    Negative: Bite, wound, or sting from fish    Negative: [1] Any break in skin (e.g., cut, puncture or scratch) AND[2] wild animal at risk for RABIES (e.g., bat, raccoon, caputo, skunk, coyote, other carnivores)    Negative: [1] Any break in skin (e.g., cut, puncture or scratch) AND[2] dog, cat, or ferret at risk for RABIES (e.g., sick, stray, unprovoked bite, developing country)    Negative: [1] Any break in skin (e.g., cut, puncture or scratch) AND[2] monkey    Negative: [1] Cut (length > 1/8 inch or 3 mm) or skin tear AND[2] any animal    Negative: [1] Bleeding AND [2] won't stop after 10 minutes of direct pressure (using correct technique)    Negative: Description of bite  sounds severe to the triager    Negative: [1] Non-bite body fluid contact (e.g., saliva, brain) AND [2] onto open cut/wound or mucous membranes AND[3] animal at high-risk for RABIES (e.g., bat, raccoon, caputo, skunk, coyote, other carnivores)    Protocols used: ANIMAL BITE-A-AH

## 2020-04-24 NOTE — PATIENT INSTRUCTIONS
2  Patient Education     Cat Bite    A cat bite can cause a wound deep enough to break the skin. In such cases, the wound is cleaned and then sometimes closed. If the wound is closed it is usually not closed completely. This is so that fluid can drain if the wound becomes infected. Often the wound is left open to heal. In addition to wound care, a tetanus shot may be given, if needed.  Home care    Wash your hands well with soap and warm water before and after caring for the wound. This helps lower the risk of infection.    Care for the wound as directed. If a dressing was applied to the wound, be sure to change it as directed.    If the wound bleeds, place a clean, soft cloth on the wound. Then firmly apply pressure until the bleeding stops. This may take up to 5 minutes. Don't release the pressure and look at the wound during this time.    Always get medical attention for cat bites on the hand. They are highly likely to become infected.    Most wounds heal within 10 days. But an infection can occur even with proper treatment. So be sure to check the wound daily for signs of infection (see below).    Antibiotics may be prescribed. These help prevent or treat infection. If you re given antibiotics, take them as directed. Also be sure to complete the medicines.  Rabies prevention  Rabies is a virus that can be carried in certain animals. These can include domestic animals such as cats and dogs. Pets fully vaccinated against rabies (2 shots) are at very low risk of infection. But because human rabies is almost always fatal, any biting pet should be confined for 10 days as an extra precaution. In general, if there is a risk for rabies, the following steps may need to be taken:    If someone s pet cat has bitten you, it should be kept in a secure area for the next 10 days to watch for signs of illness. If the pet owner won t allow this, contact your local animal control center. If the cat becomes ill or dies during that  time, contact your local animal control center at once so the animal may be tested for rabies. If the cat stays healthy for the next 10 days, there is no danger of rabies in the animal or you.    If a stray cat bit you, contact your local animal control center. They can give information on capture, quarantine, and animal rabies testing.    If you can t find the animal that bit you in the next 2 days, and if rabies exists in your area, you may need to receive the rabies vaccine series. Call your healthcare provider right away. Or return to the emergency department promptly.    All animal bites should be reported to the local animal control center. If you were not given a form to fill out, you can report this yourself.  Follow-up care  Follow up with your healthcare provider, or as directed.  When to seek medical advice  Call your healthcare provider right away if any of these occur:    Signs of infection:  ? Spreading redness or warmth from the wound  ? Increased pain or swelling  ? Fever of 100.4 F (38 C) or higher, or as directed by your healthcare provider  ? Colored fluid or pus draining from the wound  ? Enlarged lymph nodes above the area that was bitten, such as lymph nodes in the armpit if you were bitten on the hand or arm. This may be a sign of cat-scratch disease (cat-scratch fever).    Signs of rabies infection:  ? Headache  ? Confusion  ? Strange behavior  ? Increased salivating or drooling  ? Seizure    Decreased ability to move any body part near the bite area    Bleeding that can't be stopped after 5 minutes of firm pressure  Date Last Reviewed: 5/1/2018 2000-2019 [employer]. [address] [place] [address]. All rights reserved. This information is not intended as a substitute for professional medical care. Always follow your healthcare professional's instructions.           Patient Education     Cellulitis  Cellulitis is an infection of the deep layers of skin. A break in  the skin, such as a cut or scratch, can let bacteria under the skin. If the bacteria get to deep layers of the skin, it can be serious. If not treated, cellulitis can get into the bloodstream and lymph nodes. The infection can then spread throughout the body. This causes serious illness.  Cellulitis causes the affected skin to become red, swollen, warm, and sore. The reddened areas have a visible border. An open sore may leak fluid (pus). You may have a fever, chills, and pain.  Cellulitis is treated with antibiotics taken for 7 to 10 days. An open sore may be cleaned and covered with cool wet gauze. Symptoms should get better 1 to 2 days after treatment is started. Make sure to take all the antibiotics for the full number of days until they are gone. Keep taking the medicine even if your symptoms go away.  Home care  Follow these tips:    Limit the use of the part of your body with cellulitis.     If the infection is on your leg, keep your leg raised while sitting. This will help to reduce swelling.    Take all of the antibiotic medicine exactly as directed until it is gone. Do not miss any doses, especially during the first 7 days. Don t stop taking the medicine when your symptoms get better.    Keep the affected area clean and dry.    Wash your hands with soap and warm water before and after touching your skin. Anyone else who touches your skin should also wash his or her hands. Don't share towels.  Follow-up care  Follow up with your healthcare provider, or as advised. If your infection does not go away on the first antibiotic, your healthcare provider will prescribe a different one.  When to seek medical advice  Call your healthcare provider right away if any of these occur:    Red areas that spread    Swelling or pain that gets worse    Fluid leaking from the skin (pus)    Fever higher of 100.4  F (38.0  C) or higher after 2 days on antibiotics  Date Last Reviewed: 9/1/2016 2000-2019 [employer],  [facility]. [address] [place] [address]. All rights reserved. This information is not intended as a substitute for professional medical care. Always follow your healthcare professional's instructions.

## 2020-04-24 NOTE — PROGRESS NOTES
SUBJECTIVE:   Kevyn Art is a 49 year old male presenting with a chief complaint of multiple cat bites and scratches to the left upper arm and elbow area at 0600 today. This was his cat and the cat is current with immunizations. Tonight it has become red, more swollen, hot to the touch and swollen.    Onset of symptoms was 14 hour(s) ago.  Course of illness is worsening.    Severity severe  Previous Treatment none      Past Medical History:   Diagnosis Date     Atrial fibrillation with RVR (H)      Current Outpatient Medications   Medication Sig Dispense Refill     amoxicillin-clavulanate (AUGMENTIN) 875-125 MG tablet Take 1 tablet by mouth 2 times daily for 7 days 14 tablet 0     Social History     Tobacco Use     Smoking status: Never Smoker     Smokeless tobacco: Never Used   Substance Use Topics     Alcohol use: Yes     Comment: Social     Family History   Problem Relation Age of Onset     Breast Cancer Mother 62        ROS 7 point ROS neg other than the symptoms noted above in the HPI.     OBJECTIVE  /76   Pulse 89   Temp 98.1  F (36.7  C) (Oral)   Wt 79.4 kg (175 lb)   SpO2 97%   BMI 20.75 kg/m          GENERAL APPEARANCE: healthy appearing, alert     MS: extremities normal- no gross deformities noted     SKIN: mulitple puncture wounds over the left elbow and upper arm, with several scratches on the anterior arm surface. Area of erythema encompasses the entire elbow up into the upper arm and down into the forearm, this is outlined, hot to the touch and somewhat edematous.  EXTREM: Normal range of motion and sensation in all 4 extermities.     NEURO: Normal strength and tone, mentation intact and speech normal      ASSESSMENT/PLAN:      ICD-10-CM    1. Cat bite of left upper arm, initial encounter  S41.152A cefTRIAXone (ROCEPHIN) injection 1 g    W55.01XA amoxicillin-clavulanate (AUGMENTIN) 875-125 MG tablet   2. Cellulitis of left upper arm  L03.114 cefTRIAXone (ROCEPHIN) injection 1 g      amoxicillin-clavulanate (AUGMENTIN) 875-125 MG tablet        Follow Up:   1 day to assess if symptoms are worsening. May need to have IV medications if not improving. Discussed potential risks of a systemic infection and well as infection that may begin to effect the joint. He will need close monitoring.    Patient Instructions   2  Patient Education     Cat Bite    A cat bite can cause a wound deep enough to break the skin. In such cases, the wound is cleaned and then sometimes closed. If the wound is closed it is usually not closed completely. This is so that fluid can drain if the wound becomes infected. Often the wound is left open to heal. In addition to wound care, a tetanus shot may be given, if needed.  Home care    Wash your hands well with soap and warm water before and after caring for the wound. This helps lower the risk of infection.    Care for the wound as directed. If a dressing was applied to the wound, be sure to change it as directed.    If the wound bleeds, place a clean, soft cloth on the wound. Then firmly apply pressure until the bleeding stops. This may take up to 5 minutes. Don't release the pressure and look at the wound during this time.    Always get medical attention for cat bites on the hand. They are highly likely to become infected.    Most wounds heal within 10 days. But an infection can occur even with proper treatment. So be sure to check the wound daily for signs of infection (see below).    Antibiotics may be prescribed. These help prevent or treat infection. If you re given antibiotics, take them as directed. Also be sure to complete the medicines.  Rabies prevention  Rabies is a virus that can be carried in certain animals. These can include domestic animals such as cats and dogs. Pets fully vaccinated against rabies (2 shots) are at very low risk of infection. But because human rabies is almost always fatal, any biting pet should be confined for 10 days as an extra precaution.  In general, if there is a risk for rabies, the following steps may need to be taken:    If someone s pet cat has bitten you, it should be kept in a secure area for the next 10 days to watch for signs of illness. If the pet owner won t allow this, contact your local animal control center. If the cat becomes ill or dies during that time, contact your local animal control center at once so the animal may be tested for rabies. If the cat stays healthy for the next 10 days, there is no danger of rabies in the animal or you.    If a stray cat bit you, contact your local animal control center. They can give information on capture, quarantine, and animal rabies testing.    If you can t find the animal that bit you in the next 2 days, and if rabies exists in your area, you may need to receive the rabies vaccine series. Call your healthcare provider right away. Or return to the emergency department promptly.    All animal bites should be reported to the local animal control center. If you were not given a form to fill out, you can report this yourself.  Follow-up care  Follow up with your healthcare provider, or as directed.  When to seek medical advice  Call your healthcare provider right away if any of these occur:    Signs of infection:  ? Spreading redness or warmth from the wound  ? Increased pain or swelling  ? Fever of 100.4 F (38 C) or higher, or as directed by your healthcare provider  ? Colored fluid or pus draining from the wound  ? Enlarged lymph nodes above the area that was bitten, such as lymph nodes in the armpit if you were bitten on the hand or arm. This may be a sign of cat-scratch disease (cat-scratch fever).    Signs of rabies infection:  ? Headache  ? Confusion  ? Strange behavior  ? Increased salivating or drooling  ? Seizure    Decreased ability to move any body part near the bite area    Bleeding that can't be stopped after 5 minutes of firm pressure  Date Last Reviewed: 5/1/2018 2000-2019 The  [employer]. [address] [place] [address]. All rights reserved. This information is not intended as a substitute for professional medical care. Always follow your healthcare professional's instructions.           Patient Education     Cellulitis  Cellulitis is an infection of the deep layers of skin. A break in the skin, such as a cut or scratch, can let bacteria under the skin. If the bacteria get to deep layers of the skin, it can be serious. If not treated, cellulitis can get into the bloodstream and lymph nodes. The infection can then spread throughout the body. This causes serious illness.  Cellulitis causes the affected skin to become red, swollen, warm, and sore. The reddened areas have a visible border. An open sore may leak fluid (pus). You may have a fever, chills, and pain.  Cellulitis is treated with antibiotics taken for 7 to 10 days. An open sore may be cleaned and covered with cool wet gauze. Symptoms should get better 1 to 2 days after treatment is started. Make sure to take all the antibiotics for the full number of days until they are gone. Keep taking the medicine even if your symptoms go away.  Home care  Follow these tips:    Limit the use of the part of your body with cellulitis.     If the infection is on your leg, keep your leg raised while sitting. This will help to reduce swelling.    Take all of the antibiotic medicine exactly as directed until it is gone. Do not miss any doses, especially during the first 7 days. Don t stop taking the medicine when your symptoms get better.    Keep the affected area clean and dry.    Wash your hands with soap and warm water before and after touching your skin. Anyone else who touches your skin should also wash his or her hands. Don't share towels.  Follow-up care  Follow up with your healthcare provider, or as advised. If your infection does not go away on the first antibiotic, your healthcare provider will prescribe a different  one.  When to seek medical advice  Call your healthcare provider right away if any of these occur:    Red areas that spread    Swelling or pain that gets worse    Fluid leaking from the skin (pus)    Fever higher of 100.4  F (38.0  C) or higher after 2 days on antibiotics  Date Last Reviewed: 9/1/2016 2000-2019 [employer]. [address] [place] [address]. All rights reserved. This information is not intended as a substitute for professional medical care. Always follow your healthcare professional's instructions.               KAVON Sorensen, CNP  [facility] Provider

## 2020-04-24 NOTE — TELEPHONE ENCOUNTER
Called previously  - Wound area now looks like fluid is building up and area is pink. Was unable to complete [email or web address] evaluation - advised ED evaluation.    [facility] -     COVID 19 Nurse Triage Plan/Patient Instructions    Please be aware that novel coronavirus (COVID-19) may be circulating in the community. If you develop symptoms such as fever, cough, or SOB or if you have concerns about the presence of another infection including coronavirus (COVID-19), please contact your health care provider or visit [email or web address].     Disposition/Instructions    Patient to go to ED and follow protocol based instructions. Follow System Ambulatory Workflow for COVID 19.     Bring Your Own Device:  Please also bring your smart device(s) (smart phones, tablets, laptops) and their charging cables for your personal use and to communicate with your care team during your visit.    Thank you for limiting contact with others, wearing a simple mask to cover your cough, practice good hand hygiene habits and accessing our virtual services where possible to limit the spread of this virus.    For more information about COVID19 and options for caring for yourself at home, please visit the CDC website at https://www.cdc.gov/coronavirus/2019-ncov/about/steps-when-sick.html  For more options for care at [facility], please visit our website at [email or web address]    For more information, please use the [place] Department of Health COVID-19 Website: https://www.health.[facility]/diseases/coronavirus/index.html  [place] Department of Health ([facility]) COVID-19 Hotlines (Interpreters available):      Health questions: Phone Number: 133.913.4221 or 1-715.557.4851 and Hours: 7 a.m. to 7 p.m.    Schools and  questions: Phone Number: [phone] or 1-647.378.6324 and Hours 7 a.m. to 7 p.m.    Kim Whitmore RN on 4/23/2020 at 7:28 PM    Reason for Disposition    [1] Puncture wound (hole through  the skin) from claws or teeth AND [2] cat    Additional Information    Negative: [1] Major bleeding (e.g., actively dripping or spurting) AND [2] can't be stopped    Negative: Sounds like a life-threatening emergency to the triager    Negative: Snake bite    Negative: Bite, wound, or sting from fish    Negative: [1] Any break in skin (e.g., cut, puncture or scratch) AND[2] wild animal at risk for RABIES (e.g., bat, raccoon, caputo, skunk, coyote, other carnivores)    Negative: [1] Any break in skin (e.g., cut, puncture or scratch) AND[2] dog, cat, or ferret at risk for RABIES (e.g., sick, stray, unprovoked bite, developing country)    Negative: [1] Any break in skin (e.g., cut, puncture or scratch) AND[2] monkey    Negative: [1] Cut (length > 1/8 inch or 3 mm) or skin tear AND[2] any animal    Negative: [1] Bleeding AND [2] won't stop after 10 minutes of direct pressure (using correct technique)    Negative: Description of bite sounds severe to the triager    Negative: [1] Non-bite body fluid contact (e.g., saliva, brain) AND [2] onto open cut/wound or mucous membranes AND[3] animal at high-risk for RABIES (e.g., bat, raccoon, caputo, skunk, coyote, other carnivores)    Protocols used: ANIMAL BITE-A-

## 2020-11-22 ENCOUNTER — HEALTH MAINTENANCE LETTER (OUTPATIENT)
Age: 50
End: 2020-11-22

## 2021-02-15 ASSESSMENT — ENCOUNTER SYMPTOMS
FEVER: 0
DIZZINESS: 0
NAUSEA: 0
PARESTHESIAS: 0
WEAKNESS: 0
HEMATOCHEZIA: 0
MYALGIAS: 0
ARTHRALGIAS: 1
HEADACHES: 0
HEMATURIA: 0
NERVOUS/ANXIOUS: 0
DIARRHEA: 0
DYSURIA: 0
SORE THROAT: 0
EYE PAIN: 0
PALPITATIONS: 0
JOINT SWELLING: 0
SHORTNESS OF BREATH: 0
CONSTIPATION: 0
ABDOMINAL PAIN: 0
HEARTBURN: 0
CHILLS: 0
COUGH: 0
FREQUENCY: 0

## 2021-02-16 ENCOUNTER — TELEPHONE (OUTPATIENT)
Dept: GASTROENTEROLOGY | Facility: CLINIC | Age: 51
End: 2021-02-16

## 2021-02-16 ENCOUNTER — OFFICE VISIT (OUTPATIENT)
Dept: OPTOMETRY | Facility: CLINIC | Age: 51
End: 2021-02-16
Payer: COMMERCIAL

## 2021-02-16 DIAGNOSIS — H26.9 BILATERAL INCIPIENT CATARACTS: ICD-10-CM

## 2021-02-16 DIAGNOSIS — H52.4 PRESBYOPIA: Primary | ICD-10-CM

## 2021-02-16 DIAGNOSIS — Z11.59 ENCOUNTER FOR SCREENING FOR OTHER VIRAL DISEASES: ICD-10-CM

## 2021-02-16 PROCEDURE — 92004 COMPRE OPH EXAM NEW PT 1/>: CPT | Performed by: OPTOMETRIST

## 2021-02-16 PROCEDURE — 92015 DETERMINE REFRACTIVE STATE: CPT | Performed by: OPTOMETRIST

## 2021-02-16 ASSESSMENT — REFRACTION_MANIFEST
METHOD_AUTOREFRACTION: 1
OD_SPHERE: -0.75
OS_SPHERE: -1.50
OS_CYLINDER: +0.75
OD_CYLINDER: +0.25
OS_AXIS: 108
OD_AXIS: 062

## 2021-02-16 ASSESSMENT — EXTERNAL EXAM - LEFT EYE: OS_EXAM: NORMAL

## 2021-02-16 ASSESSMENT — SLIT LAMP EXAM - LIDS
COMMENTS: NORMAL
COMMENTS: NORMAL

## 2021-02-16 ASSESSMENT — VISUAL ACUITY
OS_SC: 20/20
OD_SC: 20/30-2
OS_SC: 20/30-2
OD_SC: 20/20
METHOD: SNELLEN - LINEAR

## 2021-02-16 ASSESSMENT — CONF VISUAL FIELD
OD_NORMAL: 1
METHOD: COUNTING FINGERS
OS_NORMAL: 1

## 2021-02-16 ASSESSMENT — TONOMETRY: IOP_METHOD: APPLANATION

## 2021-02-16 ASSESSMENT — CUP TO DISC RATIO
OD_RATIO: 0.5
OS_RATIO: 0.5

## 2021-02-16 ASSESSMENT — EXTERNAL EXAM - RIGHT EYE: OD_EXAM: NORMAL

## 2021-02-16 NOTE — LETTER
2/16/2021         RE: Kevyn Art  [id number] [address]        Dear Colleague,    Thank you for referring your patient, Kevyn Art, to the [facility]. Please see a copy of my visit note below.    Chief Complaint   Patient presents with     Annual Eye Exam      Blur at near  No other concerns at this time.     Last Eye Exam: None  Dilated Previously: No,  Signs and symptoms of dilation were discussed. Patient consents to dilation today.    What are you currently using to see?  does not use glasses or contacts       Distance Vision Acuity: Satisfied with vision    Near Vision Acuity: Not satisfied     Eye Comfort: good  Do you use eye drops? : No  Occupation or Hobbies: artist    Sendy Middleton CPO          Medical, surgical and family histories reviewed and updated 2/16/2021.       OBJECTIVE: See Ophthalmology exam    ASSESSMENT:    ICD-10-CM    1. Presbyopia  H52.4 REFRACTION      PLAN:   Over the counter readers +1.25-+1.50 as needed     Monica Omalley OD       Again, thank you for allowing me to participate in the care of your patient.        Sincerely,        Monica Omalley, OD

## 2021-02-16 NOTE — PROGRESS NOTES
Chief Complaint   Patient presents with     Annual Eye Exam      Blur at near  No other concerns at this time.     Last Eye Exam: None  Dilated Previously: No,  Signs and symptoms of dilation were discussed. Patient consents to dilation today.    What are you currently using to see?  does not use glasses or contacts       Distance Vision Acuity: Satisfied with vision    Near Vision Acuity: Not satisfied     Eye Comfort: good  Do you use eye drops? : No  Occupation or Hobbies: artist    Sendy Middleton CPO          Medical, surgical and family histories reviewed and updated 2/16/2021.       OBJECTIVE: See Ophthalmology exam    ASSESSMENT:    ICD-10-CM    1. Presbyopia  H52.4 REFRACTION      PLAN:   Over the counter readers +1.25-+1.50 as needed     Monica Omalley OD

## 2021-02-16 NOTE — TELEPHONE ENCOUNTER
Left message for patient to call back to schedule colonoscopy.     Procedure: Lower Endoscopy    Lower Endoscopy Type: Colonoscopy    Purpose of Colonoscopy Procedure: Screening    Colonoscopy Sedation: Conscious/Moderate    Preferred Location: [facility]    Scheduling Instructions: If you have not heard from the scheduling office within 2 business days, please call [phone].      Dx: Screen for colon cancer [Z12.11]

## 2021-02-16 NOTE — PATIENT INSTRUCTIONS
You see 20/20 in the distance without correction, had a very mild prescription one step nearsighted in R , mild astigmatism in L  Astigmatism is a common type of refractive error. It is a condition in which the human eye does not focus light evenly onto the retina, the light-sensitive tissue at the back of the eye.  Astigmatism in the eye means that the cornea is oval like a football instead of spherical like a basketball. Most astigmatic corneas have two curves - a steeper curve and a flatter curve. This causes light to focus on more than one point in the eye, resulting in blurred vision at distance or near.        Patient Education     What Is Presbyopia?  Presbyopia is the loss of close-up focusing. It's not a disease. It is the normal aging process of the eye. When you are younger, the lens in your eye changes shape to focus light directly on the back of your eye (the retina). But as you get older, the lens hardens and can't change its shape as easily. It then can t focus clearly on close objects. This makes them look blurry.       What are the symptoms?  Presbyopia makes it hard to do things close up. This includes reading small print, using tools, or threading a needle. The first sign may be a tired feeling when you look at something close up. Presbyopia most often starts when you re 40 to 45 years old. But it can start at an earlier age.   TextÃ¡do last reviewed this educational content on 8/1/2020 2000-2020 [employer]. [address] [place] [address]. All rights reserved. This information is not intended as a substitute for professional medical care. Always follow your healthcare professional's instructions.           Patient Education     Correcting Presbyopia: Glasses     Glasses can correct presbyopia. They focus the image back onto the retina. This way, you can see an object clearly. There are several kinds of glasses you can choose from.   Glasses  Presbyopia is most often  "corrected by wearing glasses. If you have no other vision problems, you may only need reading glasses. As long as you have had an eye exam and you know the strength of the reading glasses you need, you can purchase them at a pharmacy without a prescription. If you are also nearsighted or farsighted, your eye care provider can prescribe bifocals, trifocals, or progressive lenses.   Bifocals correct near and far vision (\"bi\" means \"2\"). A small half-Council in the lower part of the lens magnifies objects that are close. In some cases, the whole lower half of the lens magnifies these objects.   Trifocals correct near, middle, and far vision (\"tri\" means \"3\"). The lower part of the lens has 2 magnifying gomez. One magnifies near objects. The other magnifies objects that are about an arm s length away.   Progressive lenses  change magnifying power from near to middle to far vision. They do this slowly using a smooth transition. You don't notice a change from one power to the next. And you don't see any lines on the lenses. But the sides of the lenses will be blurry. That's because each lens promotes 3 fields of vision.   [facility] last reviewed this educational content on 7/1/2020 2000-2020 [employer]. [address] [place] [address], [place] [address]. All rights reserved. This information is not intended as a substitute for professional medical care. Always follow your healthcare professional's instructions.         very early start of cataracts     Do not need glasses full time, readers as needed +1.25-+1.50 is the prescription you would use  "

## 2021-02-16 NOTE — TELEPHONE ENCOUNTER
Patient is scheduled for colonoscopy with Dr. Leventhal    Spoke with: Isidro rAt    Date of Procedure: 3/10/2021    Location: [facility]    Sedation Type C/S    Pre-op for Unit J MAC and OR not needed    (if yes advise patient they will need a pre-op prior to procedure)      Is patient on blood thinners? -no  (If yes- inform patient to follow up with PCP or provider for follow up instructions)     Informed patient they will need an adult  yes    Informed Patient of COVID Test Requirement yes and scheduled    Preferred Pharmacy for Pre Prescription on chart    Confirmed Nurse will call to complete assessment yes    Additional comments: no

## 2021-02-17 ENCOUNTER — OFFICE VISIT (OUTPATIENT)
Dept: FAMILY MEDICINE | Facility: CLINIC | Age: 51
End: 2021-02-17
Payer: COMMERCIAL

## 2021-02-17 VITALS
HEIGHT: 77 IN | DIASTOLIC BLOOD PRESSURE: 80 MMHG | HEART RATE: 80 BPM | WEIGHT: 184.4 LBS | TEMPERATURE: 97.3 F | OXYGEN SATURATION: 100 % | SYSTOLIC BLOOD PRESSURE: 104 MMHG | RESPIRATION RATE: 16 BRPM | BODY MASS INDEX: 21.77 KG/M2

## 2021-02-17 DIAGNOSIS — M25.561 CHRONIC PAIN OF RIGHT KNEE: ICD-10-CM

## 2021-02-17 DIAGNOSIS — Z13.1 SCREENING FOR DIABETES MELLITUS: ICD-10-CM

## 2021-02-17 DIAGNOSIS — Z12.5 SCREENING PSA (PROSTATE SPECIFIC ANTIGEN): ICD-10-CM

## 2021-02-17 DIAGNOSIS — Z11.59 NEED FOR HEPATITIS C SCREENING TEST: ICD-10-CM

## 2021-02-17 DIAGNOSIS — Z13.6 SCREENING FOR CARDIOVASCULAR CONDITION: ICD-10-CM

## 2021-02-17 DIAGNOSIS — Z00.00 ROUTINE GENERAL MEDICAL EXAMINATION AT A HEALTH CARE FACILITY: Primary | ICD-10-CM

## 2021-02-17 DIAGNOSIS — G89.29 CHRONIC PAIN OF RIGHT KNEE: ICD-10-CM

## 2021-02-17 DIAGNOSIS — Z11.4 SCREENING FOR HIV WITHOUT PRESENCE OF RISK FACTORS: ICD-10-CM

## 2021-02-17 LAB
CHOLEST SERPL-MCNC: 209 MG/DL
GLUCOSE SERPL-MCNC: 79 MG/DL (ref 70–99)
HDLC SERPL-MCNC: 49 MG/DL
HIV 1+2 AB+HIV1 P24 AG SERPL QL IA: NONREACTIVE
LDLC SERPL CALC-MCNC: 134 MG/DL
NONHDLC SERPL-MCNC: 160 MG/DL
PSA SERPL-ACNC: 0.6 UG/L (ref 0–4)
TRIGL SERPL-MCNC: 129 MG/DL

## 2021-02-17 PROCEDURE — 99396 PREV VISIT EST AGE 40-64: CPT | Performed by: FAMILY MEDICINE

## 2021-02-17 PROCEDURE — 87389 HIV-1 AG W/HIV-1&-2 AB AG IA: CPT | Performed by: FAMILY MEDICINE

## 2021-02-17 PROCEDURE — 86803 HEPATITIS C AB TEST: CPT | Performed by: FAMILY MEDICINE

## 2021-02-17 PROCEDURE — 80061 LIPID PANEL: CPT | Performed by: FAMILY MEDICINE

## 2021-02-17 PROCEDURE — 82947 ASSAY GLUCOSE BLOOD QUANT: CPT | Performed by: FAMILY MEDICINE

## 2021-02-17 PROCEDURE — G0103 PSA SCREENING: HCPCS | Performed by: FAMILY MEDICINE

## 2021-02-17 PROCEDURE — 36415 COLL VENOUS BLD VENIPUNCTURE: CPT | Performed by: FAMILY MEDICINE

## 2021-02-17 ASSESSMENT — ENCOUNTER SYMPTOMS
ARTHRALGIAS: 1
EYE PAIN: 0
FREQUENCY: 0
DIZZINESS: 0
ABDOMINAL PAIN: 0
NAUSEA: 0
COUGH: 0
MYALGIAS: 0
HEADACHES: 0
DIARRHEA: 0
JOINT SWELLING: 0
NERVOUS/ANXIOUS: 0
CONSTIPATION: 0
HEMATURIA: 0
FEVER: 0
WEAKNESS: 0
SORE THROAT: 0
PARESTHESIAS: 0
SHORTNESS OF BREATH: 0
CHILLS: 0
HEMATOCHEZIA: 0
DYSURIA: 0
HEARTBURN: 0
PALPITATIONS: 0

## 2021-02-17 ASSESSMENT — MIFFLIN-ST. JEOR: SCORE: 1808.81

## 2021-02-17 NOTE — PROGRESS NOTES
SUBJECTIVE:   CC: Kevyn Art is an 50 year old male who presents for preventative health visit.       Patient has been advised of split billing requirements and indicates understanding: Yes  Healthy Habits:     Getting at least 3 servings of Calcium per day:  Yes    Bi-annual eye exam:  Yes    Dental care twice a year:  Yes    Sleep apnea or symptoms of sleep apnea:  None    Diet:  Regular (no restrictions)    Frequency of exercise:  6-7 days/week    Duration of exercise:  Less than 15 minutes    Taking medications regularly:  Not Applicable    Medication side effects:  Not applicable    PHQ-2 Total Score: 0    Additional concerns today:  Yes (right knee pain for years, various pains, feels something is moving in knee, was injured in past.)      Today's PHQ-2 Score:   PHQ-2 ( 1999 Pf[employer]) 2/15/2021   Q1: Little interest or pleasure in doing things 0   Q2: Feeling down, depressed or hopeless 0   PHQ-2 Score 0   Q1: Little interest or pleasure in doing things Not at all   Q2: Feeling down, depressed or hopeless Not at all   PHQ-2 Score 0       Abuse: Current or Past(Physical, Sexual or Emotional)- No  Do you feel safe in your environment? Yes    Have you ever done Advance Care Planning? (For example, a Health Directive, POLST, or a discussion with a medical provider or your loved ones about your wishes): Yes, patient states has an Advance Care Planning document and will bring a copy to the clinic.    Social History     Tobacco Use     Smoking status: Never Smoker     Smokeless tobacco: Never Used   Substance Use Topics     Alcohol use: Not Currently     Comment: Social     If you drink alcohol do you typically have >3 drinks per day or >7 drinks per week? No     No flowsheet data found.    Last PSA: No results found for: PSA    Reviewed orders with patient. Reviewed health maintenance and updated orders accordingly - Yes     Reviewed and updated as needed this visit by clinical staff  Tobacco  Allergies   "Meds   Med Hx  Surg Hx  Fam Hx  Soc Hx      Reviewed and updated as needed this visit by Provider    Physical job - .    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    Exercise - bike to work and for fun. Active otherwise. In winter also bikes.    No previous hospitalization or surgery.     Right knee - going on for years. Feels underlying issue.   Inside of knee - clicking of moving around.   History of injury - years ago. No fracture.   Long walking makes it worse.     History of afib. Was advised to cut back on alcohol as it was related to that.     Alcohol last month. None this month.     Review of Systems   Constitutional: Negative for chills and fever.   HENT: Negative for congestion, ear pain, hearing loss and sore throat.    Eyes: Positive for visual disturbance. Negative for pain.   Respiratory: Negative for cough and shortness of breath.    Cardiovascular: Negative for chest pain, palpitations and peripheral edema.   Gastrointestinal: Negative for abdominal pain, constipation, diarrhea, heartburn, hematochezia and nausea.   Genitourinary: Negative for discharge, dysuria, frequency, genital sores, hematuria, impotence and urgency.   Musculoskeletal: Positive for arthralgias. Negative for joint swelling and myalgias.   Skin: Negative for rash.   Neurological: Negative for dizziness, weakness, headaches and paresthesias.   Psychiatric/Behavioral: Negative for mood changes. The patient is not nervous/anxious.        OBJECTIVE:   /80 (BP Location: Left arm, Patient Position: Sitting, Cuff Size: Adult Regular)   Pulse 80   Temp 97.3  F (36.3  C) (Oral)   Resp 16   Ht 1.956 m (6' 5\")   Wt 83.6 kg (184 lb 6.4 oz)   SpO2 100%   BMI 21.87 kg/m      Physical Exam  GENERAL: healthy, alert and no distress  EYES: Eyes grossly normal to inspection, PERRL and conjunctivae and sclerae normal  HENT: ear canals and TM's normal, nose and mouth without ulcers or lesions  NECK: no adenopathy, no asymmetry, masses, or scars and " "thyroid normal to palpation  RESP: lungs clear to auscultation - no rales, rhonchi or wheezes  CV: regular rate and rhythm, normal S1 S2, no S3 or S4, no murmur, click or rub, no peripheral edema and peripheral pulses strong  ABDOMEN: soft, nontender, no hepatosplenomegaly, no masses and bowel sounds normal   (male): normal male genitalia without lesions or urethral discharge, no hernia  MS: no gross musculoskeletal defects noted, no edema  SKIN: no suspicious lesions or rashes  NEURO: Normal strength and tone, mentation intact and speech normal  PSYCH: mentation appears normal, affect normal/bright         ASSESSMENT/PLAN:   1. Routine general medical examination at a health care facility       2. Need for hepatitis C screening test     - Hepatitis C Screen Reflex to HCV RNA Quant and Genotype    3. Screening PSA (prostate specific antigen)     - PSA, screen    4. Screening for HIV without presence of risk factors     - HIV Antigen Antibody Combo    5. Screening for cardiovascular condition     - Lipid panel reflex to direct LDL Non-fasting    6. Screening for diabetes mellitus     - GLUCOSE    7. Chronic pain of right knee  Ongoing. Will see sports medicine.   - Orthopedic & Spine  Referral; Future    Patient has been advised of split billing requirements and indicates understanding: No  COUNSELING:   Reviewed preventive health counseling, as reflected in patient instructions  Special attention given to:        Regular exercise       Healthy diet/nutrition       Vision screening       Hearing screening       Colon cancer screening       Prostate cancer screening    Estimated body mass index is 21.87 kg/m  as calculated from the following:    Height as of this encounter: 1.956 m (6' 5\").    Weight as of this encounter: 83.6 kg (184 lb 6.4 oz).         He reports that he has never smoked. He has never used smokeless tobacco.      Counseling Resources:  ATP IV Guidelines  Pooled Cohorts Equation " Calculator  FRAX Risk Assessment  ICSI Preventive Guidelines  Dietary Guidelines for Americans, 2010  USDA's MyPlate  ASA Prophylaxis  Lung CA Screening    Julio César De La Rosa MD  [facility]

## 2021-02-19 ENCOUNTER — TELEPHONE (OUTPATIENT)
Dept: GASTROENTEROLOGY | Facility: OUTPATIENT CENTER | Age: 51
End: 2021-02-19

## 2021-02-19 DIAGNOSIS — M25.561 RIGHT KNEE PAIN, UNSPECIFIED CHRONICITY: Primary | ICD-10-CM

## 2021-02-19 LAB — HCV AB SERPL QL IA: NONREACTIVE

## 2021-02-19 NOTE — TELEPHONE ENCOUNTER
Caller :PATIENT    Reason for cancel? CX 3/10  DUE TO SCHEDULE CONFLICT     Rescheduled? Y 4/2-AVILA

## 2021-02-19 NOTE — TELEPHONE ENCOUNTER
DIAGNOSIS: Chronic pain of right knee /Dr De La Rosa/ no images/ Pref 1/ ortho con   APPOINTMENT DATE: 2/19/21   NOTES STATUS DETAILS   OFFICE NOTE from referring provider Internal Julio César De La Rosa MD in  FAMILY PRAC/IMPEDS   OFFICE NOTE from other specialist n/a    DISCHARGE SUMMARY from hospital N/A    DISCHARGE REPORT from the ER N/A    OPERATIVE REPORT N/A    EMG report N/A    MEDICATION LIST N/A    MRI N/A    DEXA (osteoporosis/bone health) N/A    CT SCAN N/A    XRAYS (IMAGES & REPORTS) N/A

## 2021-02-23 ENCOUNTER — PRE VISIT (OUTPATIENT)
Dept: ORTHOPEDICS | Facility: CLINIC | Age: 51
End: 2021-02-23

## 2021-02-24 ENCOUNTER — OFFICE VISIT (OUTPATIENT)
Dept: ORTHOPEDICS | Facility: CLINIC | Age: 51
End: 2021-02-24
Attending: FAMILY MEDICINE
Payer: COMMERCIAL

## 2021-02-24 ENCOUNTER — ANCILLARY PROCEDURE (OUTPATIENT)
Dept: GENERAL RADIOLOGY | Facility: CLINIC | Age: 51
End: 2021-02-24
Payer: COMMERCIAL

## 2021-02-24 VITALS — WEIGHT: 184 LBS | HEIGHT: 77 IN | BODY MASS INDEX: 21.73 KG/M2

## 2021-02-24 DIAGNOSIS — G89.29 CHRONIC PAIN OF RIGHT KNEE: ICD-10-CM

## 2021-02-24 DIAGNOSIS — M25.561 CHRONIC PAIN OF RIGHT KNEE: ICD-10-CM

## 2021-02-24 DIAGNOSIS — M25.561 RIGHT KNEE PAIN, UNSPECIFIED CHRONICITY: ICD-10-CM

## 2021-02-24 PROCEDURE — 99203 OFFICE O/P NEW LOW 30 MIN: CPT | Performed by: FAMILY MEDICINE

## 2021-02-24 PROCEDURE — 73562 X-RAY EXAM OF KNEE 3: CPT | Mod: RT | Performed by: RADIOLOGY

## 2021-02-24 ASSESSMENT — MIFFLIN-ST. JEOR: SCORE: 1807

## 2021-02-24 NOTE — PROGRESS NOTES
" Subjective:   Kevyn Art is a 50 year old male who presents with right lateral knee pain. He reports feeling a \"click.\" Denies any swelling.   Acute pain sharp pain upper lateral right leg and IT band.  Sits cross-legged and more pain joint line.   Squatting and changes right knee  Denies any swelling.  Advil if the sharp pain is there.  Sharp pains 3 separate times.  Once during a sun dance- over several days  Long bike rides, longer 75 -100 miles  Taking long ride 50-60 miles/day end of march  3 days a week of running, past 2 years gets cramps in his calves.  Painful last summer.    Background:   Date of injury: None  Duration of symptoms: 8-9 years  Mechanism of Injury: Chronic  Intensity: 0/10 at rest, sharp pain with activity   Aggravating factors: Sitting cross-legged (medial side pain), squatting, and long bike rides  Relieving Factors: Advil  Prior Evaluation: None    PAST MEDICAL, SOCIAL, SURGICAL AND FAMILY HISTORY: He  has a past medical history of Atrial fibrillation with RVR (H).  He  has no past surgical history on file.  His family history includes Breast Cancer (age of onset: 62) in his mother.  He reports that he has never smoked. He has never used smokeless tobacco. He reports previous alcohol use. He reports that he does not use drugs.    ALLERGIES: He is allergic to no clinical screening - see comments; zithromax [azithromycin]; and diltiazem.    CURRENT MEDICATIONS: He currently has no medications in their medication list.     REVIEW OF SYSTEMS: 10 point review of systems is negative except as noted above.     Exam:   Ht 1.956 m (6' 5\")   Wt 83.5 kg (184 lb)   BMI 21.82 kg/m             CONSTITUTIONAL: alert, mild distress and cooperative  HEAD: Normocephalic. No masses, lesions, tenderness or abnormalities  SKIN: no suspicious lesions or rashes  GAIT: normal  NEUROLOGIC: Non-focal, Normal muscle tone and strength, reflexes normal, sensation grossly normal.  PSYCHIATRIC: affect " normal/bright and mentation appears normal.    MUSCULOSKELETAL: right knee pain      Inspection:       AP/lateral alignment normal  Tender: IT band, 2 fingers below fibular head, lateral joint line      Non-tender: patellar facets, MCL, LCL, medial joint line, IT band, posterior knee   Active Range of Motion: all normal  Strength: quad  5-/5, Hamstrings  5-/5  Special tests: normal Valgus stress test, normal Varus, negative Lachman's test, mild irritation with lateral Shaka's, no apprehension with lateral stress of the patella.         Assessment/Plan:   Pt is a 51 yo white male with PMhx of no active medical issues, Afib in 2013, no alcohol use presenting with right knee pain  1. Right knee pain-possible lateral meniscal involvement, IT band irritation on long bike rides  Strongly encouraged the patient to meet with Kristofer SOLIMAN at [facility] for bike fit  Patient also frequently has pain between his shoulder blades on longer rides  He has a tour bike with a load in the front and the back and the load can be as much a 70 pounds  Patient is thinking about getting a lighter bike for commute    RTC 6-8 weeks  X-RAY INTERPRETATION:   X-Ray of the Right Knee: 3-view, Melchor, lateral, sunrise  I ordered and interpreted in the office today was negative for fracture, subluxation or joint space abnormality.

## 2021-02-24 NOTE — LETTER
"  2/24/2021      RE: Kevyn Art  [address] [facility] [address]        Subjective:   Kevyn Art is a 50 year old male who presents with right lateral knee pain. He reports feeling a \"click.\" Denies any swelling.   Acute pain sharp pain upper lateral right leg and IT band.  Sits cross-legged and more pain joint line.   Squatting and changes right knee  Denies any swelling.  Advil if the sharp pain is there.  Sharp pains 3 separate times.  Once during a sun dance- over several days  Long bike rides, longer 75 -100 miles  Taking long ride 50-60 miles/day end of march  3 days a week of running, past 2 years gets cramps in his calves.  Painful last summer.    Background:   Date of injury: None  Duration of symptoms: 8-9 years  Mechanism of Injury: Chronic  Intensity: 0/10 at rest, sharp pain with activity   Aggravating factors: Sitting cross-legged (medial side pain), squatting, and long bike rides  Relieving Factors: Advil  Prior Evaluation: None    PAST MEDICAL, SOCIAL, SURGICAL AND FAMILY HISTORY: He  has a past medical history of Atrial fibrillation with RVR (H).  He  has no past surgical history on file.  His family history includes Breast Cancer (age of onset: 62) in his mother.  He reports that he has never smoked. He has never used smokeless tobacco. He reports previous alcohol use. He reports that he does not use drugs.    ALLERGIES: He is allergic to no clinical screening - see comments; zithromax [azithromycin]; and diltiazem.    CURRENT MEDICATIONS: He currently has no medications in their medication list.     REVIEW OF SYSTEMS: 10 point review of systems is negative except as noted above.     Exam:   Ht 1.956 m (6' 5\")   Wt 83.5 kg (184 lb)   BMI 21.82 kg/m             CONSTITUTIONAL: alert, mild distress and cooperative  HEAD: Normocephalic. No masses, lesions, tenderness or abnormalities  SKIN: no suspicious lesions or rashes  GAIT: normal  NEUROLOGIC: Non-focal, Normal " muscle tone and strength, reflexes normal, sensation grossly normal.  PSYCHIATRIC: affect normal/bright and mentation appears normal.    MUSCULOSKELETAL: right knee pain      Inspection:       AP/lateral alignment normal  Tender: IT band, 2 fingers below fibular head, lateral joint line      Non-tender: patellar facets, MCL, LCL, medial joint line, IT band, posterior knee   Active Range of Motion: all normal  Strength: quad  5-/5, Hamstrings  5-/5  Special tests: normal Valgus stress test, normal Varus, negative Lachman's test, mild irritation with lateral Shaka's, no apprehension with lateral stress of the patella.         Assessment/Plan:   Pt is a 49 yo white male with PMhx of no active medical issues, Afib in 2013, no alcohol use presenting with right knee pain  1. Right knee pain-possible lateral meniscal involvement, IT band irritation on long bike rides  Strongly encouraged the patient to meet with Kristofer SOLIMAN at [facility] for bike fit  Patient also frequently has pain between his shoulder blades on longer rides  He has a tour bike with a load in the front and the back and the load can be as much a 70 pounds  Patient is thinking about getting a lighter bike for commute    RTC 6-8 weeks  X-RAY INTERPRETATION:   X-Ray of the Right Knee: 3-view, Melchor, lateral, sunrise  I ordered and interpreted in the office today was negative for fracture, subluxation or joint space abnormality.      Vicki Abarca MD

## 2021-03-09 ENCOUNTER — THERAPY VISIT (OUTPATIENT)
Dept: PHYSICAL THERAPY | Facility: CLINIC | Age: 51
End: 2021-03-09
Attending: FAMILY MEDICINE
Payer: COMMERCIAL

## 2021-03-09 ENCOUNTER — OFFICE VISIT (OUTPATIENT)
Dept: PODIATRY | Facility: CLINIC | Age: 51
End: 2021-03-09
Payer: COMMERCIAL

## 2021-03-09 VITALS
BODY MASS INDEX: 21.73 KG/M2 | DIASTOLIC BLOOD PRESSURE: 62 MMHG | SYSTOLIC BLOOD PRESSURE: 98 MMHG | HEIGHT: 77 IN | WEIGHT: 184 LBS

## 2021-03-09 DIAGNOSIS — M76.61 TENDONITIS, ACHILLES, RIGHT: ICD-10-CM

## 2021-03-09 DIAGNOSIS — M77.31 BONE SPUR OF POSTERIOR PORTION OF RIGHT CALCANEUS: ICD-10-CM

## 2021-03-09 DIAGNOSIS — M25.561 CHRONIC PAIN OF RIGHT KNEE: ICD-10-CM

## 2021-03-09 DIAGNOSIS — S89.90XA KNEE INJURY: ICD-10-CM

## 2021-03-09 DIAGNOSIS — G89.29 CHRONIC PAIN OF RIGHT KNEE: ICD-10-CM

## 2021-03-09 DIAGNOSIS — M79.671 PAIN OF RIGHT HEEL: Primary | ICD-10-CM

## 2021-03-09 PROCEDURE — 97161 PT EVAL LOW COMPLEX 20 MIN: CPT | Mod: GP | Performed by: PHYSICAL THERAPIST

## 2021-03-09 PROCEDURE — 97530 THERAPEUTIC ACTIVITIES: CPT | Mod: GP | Performed by: PHYSICAL THERAPIST

## 2021-03-09 PROCEDURE — 99203 OFFICE O/P NEW LOW 30 MIN: CPT | Performed by: PODIATRIST

## 2021-03-09 PROCEDURE — 97110 THERAPEUTIC EXERCISES: CPT | Mod: GP | Performed by: PHYSICAL THERAPIST

## 2021-03-09 ASSESSMENT — ACTIVITIES OF DAILY LIVING (ADL)
SQUAT: ACTIVITY IS MINIMALLY DIFFICULT
WALK: ACTIVITY IS NOT DIFFICULT
SIT WITH YOUR KNEE BENT: ACTIVITY IS NOT DIFFICULT
HOW_WOULD_YOU_RATE_THE_CURRENT_FUNCTION_OF_YOUR_KNEE_DURING_YOUR_USUAL_DAILY_ACTIVITIES_ON_A_SCALE_FROM_0_TO_100_WITH_100_BEING_YOUR_LEVEL_OF_KNEE_FUNCTION_PRIOR_TO_YOUR_INJURY_AND_0_BEING_THE_INABILITY_TO_PERFORM_ANY_OF_YOUR_USUAL_DAILY_ACTIVITIES?: 80
GO DOWN STAIRS: ACTIVITY IS MINIMALLY DIFFICULT
STAND: ACTIVITY IS NOT DIFFICULT
PAIN: I DO NOT HAVE THE SYMPTOM
STIFFNESS: I DO NOT HAVE THE SYMPTOM
LIMPING: I DO NOT HAVE THE SYMPTOM
WEAKNESS: I DO NOT HAVE THE SYMPTOM
SWELLING: I DO NOT HAVE THE SYMPTOM
HOW_WOULD_YOU_RATE_THE_OVERALL_FUNCTION_OF_YOUR_KNEE_DURING_YOUR_USUAL_DAILY_ACTIVITIES?: NEARLY NORMAL
RAW_SCORE: 66
KNEEL ON THE FRONT OF YOUR KNEE: ACTIVITY IS NOT DIFFICULT
RISE FROM A CHAIR: ACTIVITY IS NOT DIFFICULT
GO UP STAIRS: ACTIVITY IS MINIMALLY DIFFICULT
AS_A_RESULT_OF_YOUR_KNEE_INJURY,_HOW_WOULD_YOU_RATE_YOUR_CURRENT_LEVEL_OF_DAILY_ACTIVITY?: NEARLY NORMAL
KNEE_ACTIVITY_OF_DAILY_LIVING_SCORE: 94.29
KNEE_ACTIVITY_OF_DAILY_LIVING_SUM: 66
GIVING WAY, BUCKLING OR SHIFTING OF KNEE: I HAVE THE SYMPTOM BUT IT DOES NOT AFFECT MY ACTIVITY

## 2021-03-09 ASSESSMENT — MIFFLIN-ST. JEOR: SCORE: 1807

## 2021-03-09 NOTE — PROGRESS NOTES
[facility] Initial Evaluation  Subjective:    Patient Health History  Kevyn Art being seen for knee stretching & strengthening & bike fitting.       Problem occurred: sundancing and long bike tours   Pain is reported as 0/10 on pain scale.  General health as reported by patient is good.  Pertinent medical history includes: none.     Medical allergies: none.   Surgeries include:  None.    Current medications:  None.    Current occupation is , tile maker.   Primary job tasks include:  Computer work, lifting/carrying and operating a machine/assembly.                commuting and weekend rides 30-50 miles a week  Can get a sharp pain on a long ride-meds help-right lateral knee   Sundancing-week long festival-dancing one hour a day  Pt is 6'5''  70 pound weight on bikepacking trip  Standing and locking out knees can cause suprapatellar pain  Reports also onset of achilles pain right 2 weeks ago inisidiously and increased with long walk-4 miles(wearing a different shoe on walk but does not think that is it-howell minimalist shoe).  Normal work/walk shoes Obozs-low ankle version of hiking shoes.  Iced and rest decreased it-then stretched it and it increased again.  This does not hurt with biking.    Rubbing of back of shoe hurts.  Pain is over medial post calc.  Sees a chiro twice a month and he put a laser on it and adjustments.  Podiatrist in [place] is seeing it later today.    Subjective:    Referral source (MD-Rima)   Dx right knee pain  DOI: 3/9/11  Type of bike: surly road bike  How long have you been riding this bike: 10 years  Have you had a previous bikefit: no  Any recent changes to your bike new pedal-cleats in for longer rides  Type of pedals: (clipless)  Weekly miles biking: see above  Do you ride year round:(yes)-fixie in the winter  How would you describe the type of rider you are (commuter, recreational rider)     Symptomology:   Do you currently have pain (  no)   How long have you had the pain years   Frequency of pain daily   Description of pain sharp, achy  Where is the pain right lat knee  Aggravating factors (other activities outside of riding that causes pain) and pain scale rating    How do you provoke the pain with riding(hills, mileage)?     No regular stretching and strengthening  Has a wood stove-often split and stack wood-physical labor.    Objective:    Off the Bike measures, as indicated    Flexibility Screen:    Right Left             Hamstring - -   Hip Flexor - -   ITB/Lat Hip in SL - -   Quadriceps - -   Gastroc/ Soleus - -   - = normal  + = mild tightness  ++ = moderate tightness  +++ = significant tightness    Muscle Strength (x/5)    Right Left   Hip Flex     Hip Ext 4- tendency to abd 4   Hip ABD 4 4+   Hip ADD     Knee Ext     Knee Flex     Prone Plank       Good quad contraction ASHLEY    Dynamic Movement Screen:    2 leg squat:  Good form pain with incr depth 6/10       1 leg squat:   Right: valgus collapse and pain   Left: valgus collapse  Static bike measures measurements:    Seat Level: measure off  if possible Initial ( +1 degrees) Post  (No change)               Knee Left/right    Knee flex angle (seat height) 29 degrees/mm       Seat Position  Seat Fore/aft (ant position)/mm moved10       Trunk angle 50 to 45   Shoulder angle (humerus, T1-7 with axis at GH, goal around 90) 80-84   Shoulder width (Good width)      left right   Elbow (flexion angle) 35 degrees 35degrees                               Dynamic Assessment:    Anterior view:  Knee position/pedal stroke: left right    Top(out mild) bottom( neutral) Top(neutra1) bottom( neutral)     Lateral view:  Trunk Position (good position)   Reach (under reaching)   Knee position (good position-slightly ant)       Clinical assessment and changes:    Pt reports with right knee pain and bikefit.  Pt presents with a slightly ant seat position that was adjusted this will also help the shoulder  angle and reach.  Knee angle was positioned well.  Most noticeable was his riding/gearing selection.  Tendency to ride in low gears and mash the pedals-discussion occurred along with HEP and drills to address concerns.  Pt demo'd good understanding and will follow up in three weeks.                  System    Physical Exam    General     ROS    Assessment/Plan:    Patient is a 50 year old male with right side knee complaints.    Patient has the following significant findings with corresponding treatment plan.                Diagnosis 1:  Right knee OA vs meniscal  Pain -  hot/cold therapy, splint/taping/bracing/orthotics, self management, education and home program  Decreased function - therapeutic activities    Therapy Evaluation Codes:   1) History comprised of:   Personal factors that impact the plan of care:      None.    Comorbidity factors that impact the plan of care are:      None.     Medications impacting care: None.  2) Examination of Body Systems comprised of:   Body structures and functions that impact the plan of care:      Knee.   Activity limitations that impact the plan of care are:      Squatting/kneeling.  3) Clinical presentation characteristics are:   Stable/Uncomplicated.  4) Decision-Making    Low complexity using standardized patient assessment instrument and/or measureable assessment of functional outcome.  Cumulative Therapy Evaluation is: Low complexity.    Previous and current functional limitations:  (See Goal Flow Sheet for this information)    Short term and Long term goals: (See Goal Flow Sheet for this information)     Communication ability:  Patient appears to be able to clearly communicate and understand verbal and written communication and follow directions correctly.  Treatment Explanation - The following has been discussed with the patient:   RX ordered/plan of care  Anticipated outcomes  Possible risks and side effects  This patient would benefit from PT intervention to resume  normal activities.   Rehab potential is good.    Frequency:  1 X week, once daily  Duration:  for 8 weeks  Discharge Plan:  Achieve all LTG.  Independent in home treatment program.  Reach maximal therapeutic benefit.    Please refer to the daily flowsheet for treatment today, total treatment time and time spent performing 1:1 timed codes.

## 2021-03-09 NOTE — LETTER
"    3/9/2021         RE: Kevyn Art  [address] [facility] [address]        Dear Colleague,    Thank you for referring your patient, Kevyn Art, to the [facility]. Please see a copy of my visit note below.    ASSESSMENT:  Encounter Diagnoses   Name Primary?     Tendonitis, Achilles, right      Bone spur of posterior portion of right calcaneus      posterior Pain of right heel Yes     MEDICAL DECISION MAKING:  He has posterolateral bumps on both heels.  I explained that the bone can be the cause of overlying soft tissue irritation and inflammation.  His pain is also in the region of the Achilles insertion.  We will treat this as an insertional Achilles tendinitis.    We discussed NSAIDs, ice, gentle stretching, bracing, relative rest.  For biking, I advised him to place the pedal more under the midfoot rather than the forefoot.  Given his activity level, I recommended physical therapy.  He is interested and was referred.  We discussed the use of an ankle Tri-Lock brace.  He opted to wait on this.    I have asked him to follow-up in 1 to 2 months.  If the pain is persisting and/or worsening, an MRI will be considered.    Disclaimer: This note consists of symbols derived from keyboarding, dictation and/or voice recognition software. As a result, there may be errors in the script that have gone undetected. Please consider this when interpreting information found in this chart.    Clifford Rolle DPM, FACFAS, [facility] Department of Podiatry/Foot & Ankle Surgery      ____________________________________________________________________    HPI:         Chief Complaint: right posterior heel pain  Onset of problem: 2 weeks of pain  Pain/ discomfort is described as:  Ache, sensitive to the touch  Pain Ratin/10 at worst. Pain was decreasing. He then aggravated it by stretching. It is no improving again   Frequency:  Daily  \"It was bad a couple of evenings\" " "   The pain is exacerbated by stretching, walking  Previous treatment: ice, elevation, rest, stretching  *  Past Medical History:   Diagnosis Date     Atrial fibrillation with RVR (H)    *  *No past surgical history on file.*  *  No current outpatient medications on file.         EXAM:    Vitals: BP 98/62   Ht 1.956 m (6' 5\")   Wt 83.5 kg (184 lb)   BMI 21.82 kg/m    BMI: Body mass index is 21.82 kg/m .    Constitutional:  Kevyn Art is in no apparent distress, appears well-nourished.  Cooperative with history and physical exam.    Vascular:  Pedal pulses are palpable for both the DP and PT arteries.  CFT < 3 sec.  No edema.      Neuro: Light touch sensation is intact to the L4, L5, S1 distributions  No evidence of weakness, spasticity, or contracture in the lower extremities.     Derm: Normal texture and turgor.  No erythema, ecchymosis, or cyanosis.  No open lesions.     Musculoskeletal:    Lower extremity muscle strength is normal. No gross deformities.  Pain on palpation: over a bump, posterolateral right heel.             Again, thank you for allowing me to participate in the care of your patient.        Sincerely,        Clifford Rolle, LUNA    "

## 2021-03-09 NOTE — PATIENT INSTRUCTIONS
TENDONITIS   Tendons are the strong fibrous portions of muscles that attach to bones and allow the muscle to move a joint when it contracts. Tendons are very strong because they have a lot of force exerted on them. Sometimes tendons can become painful because they have suffered an acute injury, in which too much force was exerted at one time, or an overuse injury, in which a normal force was exerted too frequently or over a prolonged period of time. As a result, there is damage to the tendon and its surrounding soft tissue structures and they become inflammed. Because tendons do not have a great blood supply, they do not heal rapidly and the inflammation can become chronic.   Conservative treatment for tendinitis involves rest and anti-inflammatory measures. Ice is applied 15 minutes 2-3 times daily. Anti-inflammatory medications called NSAIDs (ibuprofen, example) can be taken provided they are used with caution, as they can lead to internal bleeding and increase the risk ofstroke and heart attack. Sometimes topical nitroglycerin is prescribed to help with pain. Often your doctor will use a special shoe or removable walking cast to immobilize the tendon, allowing it to heal without further damage from use. These devices are very useful in helping tendons heal, but they may slow you down or make you feel like your hip, knee, or back are out ofalignment. This is temporary and should go away once you are out ofthe immobilization. You should not use a walking cast when showering or driving. Another option is Platelet Rich Plasma injections. (Normally done with a Sports and Orthorapedic doctor.   If conservative measures fail, your physician may need to surgically repair the tendon by removing any chronic inflammatory tissue and sewing it back together. Sometimes it is sewn to an adjacent tendon with similar function for support and sometimes it is lengthened. . Sometimes the bones around the tendon need to be realigned  or reshaped to better support the tendon or prevent further damage. Your foot and ankle surgeon will discuss the specifics of your surgery with you, should you need it.      Towel stretch: Sit on a hard surface with your injured leg stretched out in front of you. Loop a towel around your toes and the ball of your foot and pull the towel toward your body keeping your leg straight. Hold this position for 15 to 30 seconds and then relax. Repeat 3 times. Then push the towel away with the ball of your foot. Repeat 3 times.  When you don't feel much of a stretch using the towel, you can start the standing calf stretch and the following exercises.    Standing calf stretch: Stand facing a wall with your hands on the wall at about eye level. Keep your injured leg back with your heel on the floor. Keep the other leg forward with the knee bent. Turn your back foot slightly inward (as if you were pigeon-toed). Slowly lean into the wall until you feel a stretch in the back of your calf. Hold the stretch for 15 to 30 seconds. Return to the starting position. Repeat 3 times. Do this exercise several times each day.     Standing soleus stretch: Stand facing a wall with your hands on the wall at about chest height. Keep your injured leg back with your heel on the floor. Keep the other leg forward with the knee bent. Turn your back foot slightly inward (as if you were pigeon-toed). Bend your back knee slightly and gently lean into the wall until you feel a stretch in the lower calf of your injured leg. Hold the stretch for 15 to 30 seconds. Return to the starting position. Repeat 3 times.     Achilles stretch: Stand with the ball of one foot on a stair. Reach for the step below with your heel until you feel a stretch in the arch of your foot. Hold this position for 15 to 30 seconds and then relax. Repeat 3 times.     Heel raise: Balance yourself while standing behind a chair or counter. Using the chair or counter as a support to help  you, raise your body up onto your toes and hold for 5 seconds. Then slowly lower yourself down without holding onto the support. (It's OK to keep holding onto the support if you need to.) When this exercise becomes less painful, try lowering yourself down on the injured leg only. Repeat 15 times. Do 2 sets of 15. Rest 30 seconds between sets.     Step-up: Stand with the foot of your injured leg on a support 3 to 5 inches high (like a small step or block of wood). Keep your other foot flat on the floor. Shift your weight onto the injured leg on the support. Straighten your injured leg as the other leg comes off the floor. Return to the starting position by bending your injured leg and slowly lowering your uninjured leg back to the floor. Do 2 sets of 15.     Resisted ankle eversion: Sit with both legs stretched out in front of you, with your feet about a shoulder's width apart. Tie a loop in one end of elastic tubing. Put the foot of your injured leg through the loop so that the tubing goes around the arch of that foot and wraps around the outside of the other foot. Hold onto the other end of the tubing with your hand to provide tension. Turn the foot of your injured leg up and out. Make sure you keep your other foot still so that it will allow the tubing to stretch as you move the foot of your injured leg. Return to the starting position. Do 2 sets of 15.     Balance and reach exercises: Stand next to a chair with your injured leg farther from the chair. The chair will provide support if you need it. Stand on the foot of your injured leg and bend your knee slightly. Try to raise the arch of this foot while keeping your big toe on the floor. Keep your foot in this position. With the hand that is farther away from the chair, reach forward in front of you by bending at the waist. Avoid bending your knee any more as you do this. Repeat this 10 times. To make the exercise more challenging, reach farther in front of you.  Do 2 sets of 10.  the same position as above. While keeping your arch height, reach the hand that is farther away from the chair across your body toward the chair. The farther you reach, the more challenging the exercise. Do 2 sets of 10.     Resisted ankle eversion: Sit with both legs stretched out in front of you, with your feet about a shoulder's width apart. Tie a loop in one end of elastic tubing. Put the foot of your injured leg through the loop so that the tubing goes around the arch of that foot and wraps around the outside of the other foot. Hold onto the other end of the tubing with your hand to provide tension. Turn the foot of your injured leg up and out. Make sure you keep your other foot still so that it will allow the tubing to stretch as you move the foot of your injured leg. Return to the starting position. Do 2 sets of 15.   If you have access to a wobble board, do the following exercises:  Wobble board exercises:     Stand on a wobble board with your feet shoulder width apart. Rock the board forwards and backwards 30 times, then side to side 30 times. Hold on to a chair if you need support.     Rotate the wobble board around so that the edge of the board is in contact with the floor at all times. Do this 30 times in a clockwise and then a counterclockwise direction.     Balance on the wobble board for as long as you can without letting the edges touch the floor. Try to do this for 2 minutes without touching the floor.     Rotate the wobble board in clockwise and counterclockwise circles, but do not let the edge of the board touch the floor.     When you have mastered exercises A through D, try repeating them while standing on just your injured leg.     After you are able to do these exercises on one leg, try to do them with your eyes closed. Make sure you have something nearby to support you in case you lose your balance.

## 2021-03-09 NOTE — PROGRESS NOTES
"ASSESSMENT:  Encounter Diagnoses   Name Primary?     Tendonitis, Achilles, right      Bone spur of posterior portion of right calcaneus      posterior Pain of right heel Yes     MEDICAL DECISION MAKING:  He has posterolateral bumps on both heels.  I explained that the bone can be the cause of overlying soft tissue irritation and inflammation.  His pain is also in the region of the Achilles insertion.  We will treat this as an insertional Achilles tendinitis.    We discussed NSAIDs, ice, gentle stretching, bracing, relative rest.  For biking, I advised him to place the pedal more under the midfoot rather than the forefoot.  Given his activity level, I recommended physical therapy.  He is interested and was referred.  We discussed the use of an ankle Tri-Lock brace.  He opted to wait on this.    I have asked him to follow-up in 1 to 2 months.  If the pain is persisting and/or worsening, an MRI will be considered.    Disclaimer: This note consists of symbols derived from keyboarding, dictation and/or voice recognition software. As a result, there may be errors in the script that have gone undetected. Please consider this when interpreting information found in this chart.    Clifford Rolle DPM, FACFAS, MS    [facility] Department of Podiatry/Foot & Ankle Surgery      ____________________________________________________________________    HPI:         Chief Complaint: right posterior heel pain  Onset of problem: 2 weeks of pain  Pain/ discomfort is described as:  Ache, sensitive to the touch  Pain Ratin/10 at worst. Pain was decreasing. He then aggravated it by stretching. It is no improving again   Frequency:  Daily  \"It was bad a couple of evenings\"    The pain is exacerbated by stretching, walking  Previous treatment: ice, elevation, rest, stretching  *  Past Medical History:   Diagnosis Date     Atrial fibrillation with RVR (H)    *  *No past surgical history on file.*  *  No current outpatient medications on file. " "        EXAM:    Vitals: BP 98/62   Ht 1.956 m (6' 5\")   Wt 83.5 kg (184 lb)   BMI 21.82 kg/m    BMI: Body mass index is 21.82 kg/m .    Constitutional:  Kevyn Art is in no apparent distress, appears well-nourished.  Cooperative with history and physical exam.    Vascular:  Pedal pulses are palpable for both the DP and PT arteries.  CFT < 3 sec.  No edema.      Neuro: Light touch sensation is intact to the L4, L5, S1 distributions  No evidence of weakness, spasticity, or contracture in the lower extremities.     Derm: Normal texture and turgor.  No erythema, ecchymosis, or cyanosis.  No open lesions.     Musculoskeletal:    Lower extremity muscle strength is normal. No gross deformities.  Pain on palpation: over a bump, posterolateral right heel.         "

## 2021-03-16 ENCOUNTER — THERAPY VISIT (OUTPATIENT)
Dept: PHYSICAL THERAPY | Facility: CLINIC | Age: 51
End: 2021-03-16
Attending: FAMILY MEDICINE
Payer: COMMERCIAL

## 2021-03-16 DIAGNOSIS — S89.90XA KNEE INJURY: ICD-10-CM

## 2021-03-16 PROCEDURE — 97110 THERAPEUTIC EXERCISES: CPT | Mod: GP | Performed by: PHYSICAL THERAPIST

## 2021-03-16 PROCEDURE — 97530 THERAPEUTIC ACTIVITIES: CPT | Mod: GP | Performed by: PHYSICAL THERAPIST

## 2021-03-16 ASSESSMENT — ACTIVITIES OF DAILY LIVING (ADL)
TOTAL_ADL_ITEM_SCORE:: 64
ACTIVITIES_OF_DAILY_LIVING_MEASURE_SCORE(%):: 76.19
HIGHEST_POTENTIAL_ADL_SCORE:: 84

## 2021-03-16 NOTE — PROGRESS NOTES
S: Patient comes with new order for eval and treat for R Achilles pain, patient states it has been gradually improving over the last week. Patient inquired about general health recommendations for aging population.     Answers for HPI/ROS submitted by the patient on 3/11/2021   History Reported by Patient  Reason for Visit:: Heel pain, tendonitis or bursitis  When problem began:: 2/21/2021  How problem occurred:: not sure, bumped heel on a chair then I did a long bike ride that day but that's not outside of my normal activity. The soreness showed up early that week and got progressively worse. It's much better now, so I'm looking for ways to prevent it from recurring.  Number scale: 1/10  General health as reported by patient: good  Please check all that apply to your current or past medical history: none  Medical allergies: none  Surgeries: none  Medications you are currently taking: none  Occupation:: Design, [employer]  What are your primary job tasks: lifting/carrying, operating a machine/assembly, repetitive tasks    O:  Pain source is posterior calcaneous  No tenderness with palpation to bone or tendon  Decreased gastroc flexibility on R  Good stability on uneven surface SL  Good stability and control with SL hopping    A: Patient has posterior calc bursitis presentation.    P: Plan to discharge with calf stretch and strengthening program, follow-up if things get worse. PT will send over sources for overall health for aging population.

## 2021-03-19 DIAGNOSIS — Z11.59 ENCOUNTER FOR SCREENING FOR OTHER VIRAL DISEASES: ICD-10-CM

## 2021-03-29 DIAGNOSIS — Z11.59 ENCOUNTER FOR SCREENING FOR OTHER VIRAL DISEASES: ICD-10-CM

## 2021-03-29 LAB
LABORATORY COMMENT REPORT: NORMAL
SARS-COV-2 RNA RESP QL NAA+PROBE: NEGATIVE
SARS-COV-2 RNA RESP QL NAA+PROBE: NORMAL
SPECIMEN SOURCE: NORMAL
SPECIMEN SOURCE: NORMAL

## 2021-03-29 PROCEDURE — U0003 INFECTIOUS AGENT DETECTION BY NUCLEIC ACID (DNA OR RNA); SEVERE ACUTE RESPIRATORY SYNDROME CORONAVIRUS 2 (SARS-COV-2) (CORONAVIRUS DISEASE [COVID-19]), AMPLIFIED PROBE TECHNIQUE, MAKING USE OF HIGH THROUGHPUT TECHNOLOGIES AS DESCRIBED BY CMS-2020-01-R: HCPCS | Performed by: INTERNAL MEDICINE

## 2021-03-29 PROCEDURE — U0005 INFEC AGEN DETEC AMPLI PROBE: HCPCS | Performed by: INTERNAL MEDICINE

## 2021-04-02 ENCOUNTER — HOSPITAL ENCOUNTER (OUTPATIENT)
Facility: AMBULATORY SURGERY CENTER | Age: 51
Discharge: HOME | End: 2021-04-02
Attending: INTERNAL MEDICINE | Admitting: INTERNAL MEDICINE
Payer: COMMERCIAL

## 2021-04-02 VITALS
SYSTOLIC BLOOD PRESSURE: 120 MMHG | HEIGHT: 77 IN | BODY MASS INDEX: 21.25 KG/M2 | HEART RATE: 76 BPM | DIASTOLIC BLOOD PRESSURE: 81 MMHG | RESPIRATION RATE: 16 BRPM | OXYGEN SATURATION: 98 % | TEMPERATURE: 97.7 F | WEIGHT: 180 LBS

## 2021-04-02 LAB — COLONOSCOPY: NORMAL

## 2021-04-02 PROCEDURE — 45378 DIAGNOSTIC COLONOSCOPY: CPT

## 2021-04-02 RX ORDER — PROCHLORPERAZINE MALEATE 10 MG
10 TABLET ORAL EVERY 6 HOURS PRN
Status: DISCONTINUED | OUTPATIENT
Start: 2021-04-02 | End: 2021-04-03 | Stop reason: HOSPADM

## 2021-04-02 RX ORDER — FENTANYL CITRATE 50 UG/ML
INJECTION, SOLUTION INTRAMUSCULAR; INTRAVENOUS PRN
Status: DISCONTINUED | OUTPATIENT
Start: 2021-04-02 | End: 2021-04-02 | Stop reason: HOSPADM

## 2021-04-02 RX ORDER — NALOXONE HYDROCHLORIDE 0.4 MG/ML
0.2 INJECTION, SOLUTION INTRAMUSCULAR; INTRAVENOUS; SUBCUTANEOUS
Status: DISCONTINUED | OUTPATIENT
Start: 2021-04-02 | End: 2021-04-03 | Stop reason: HOSPADM

## 2021-04-02 RX ORDER — FLUMAZENIL 0.1 MG/ML
0.2 INJECTION, SOLUTION INTRAVENOUS
Status: ACTIVE | OUTPATIENT
Start: 2021-04-02 | End: 2021-04-02

## 2021-04-02 RX ORDER — NALOXONE HYDROCHLORIDE 0.4 MG/ML
0.4 INJECTION, SOLUTION INTRAMUSCULAR; INTRAVENOUS; SUBCUTANEOUS
Status: DISCONTINUED | OUTPATIENT
Start: 2021-04-02 | End: 2021-04-03 | Stop reason: HOSPADM

## 2021-04-02 RX ORDER — ONDANSETRON 2 MG/ML
4 INJECTION INTRAMUSCULAR; INTRAVENOUS EVERY 6 HOURS PRN
Status: DISCONTINUED | OUTPATIENT
Start: 2021-04-02 | End: 2021-04-03 | Stop reason: HOSPADM

## 2021-04-02 RX ORDER — ONDANSETRON 4 MG/1
4 TABLET, ORALLY DISINTEGRATING ORAL EVERY 6 HOURS PRN
Status: DISCONTINUED | OUTPATIENT
Start: 2021-04-02 | End: 2021-04-03 | Stop reason: HOSPADM

## 2021-04-02 RX ORDER — LIDOCAINE 40 MG/G
CREAM TOPICAL
Status: DISCONTINUED | OUTPATIENT
Start: 2021-04-02 | End: 2021-04-02 | Stop reason: HOSPADM

## 2021-04-02 RX ORDER — ONDANSETRON 2 MG/ML
4 INJECTION INTRAMUSCULAR; INTRAVENOUS
Status: DISCONTINUED | OUTPATIENT
Start: 2021-04-02 | End: 2021-04-02 | Stop reason: HOSPADM

## 2021-04-02 ASSESSMENT — MIFFLIN-ST. JEOR: SCORE: 1788.85

## 2021-08-15 ENCOUNTER — E-VISIT (OUTPATIENT)
Dept: FAMILY MEDICINE | Facility: CLINIC | Age: 51
End: 2021-08-15
Payer: COMMERCIAL

## 2021-08-15 DIAGNOSIS — R53.83 OTHER FATIGUE: Primary | ICD-10-CM

## 2021-08-15 PROCEDURE — 99421 OL DIG E/M SVC 5-10 MIN: CPT | Performed by: FAMILY MEDICINE

## 2021-08-16 ENCOUNTER — OFFICE VISIT (OUTPATIENT)
Dept: FAMILY MEDICINE | Facility: CLINIC | Age: 51
End: 2021-08-16
Payer: COMMERCIAL

## 2021-08-16 VITALS
DIASTOLIC BLOOD PRESSURE: 77 MMHG | SYSTOLIC BLOOD PRESSURE: 108 MMHG | HEART RATE: 85 BPM | OXYGEN SATURATION: 96 % | TEMPERATURE: 97.8 F

## 2021-08-16 DIAGNOSIS — R52 BODY ACHES: ICD-10-CM

## 2021-08-16 DIAGNOSIS — R53.83 FATIGUE, UNSPECIFIED TYPE: Primary | ICD-10-CM

## 2021-08-16 DIAGNOSIS — A69.20 LYME DISEASE: ICD-10-CM

## 2021-08-16 DIAGNOSIS — R50.9 FEVER, UNSPECIFIED FEVER CAUSE: ICD-10-CM

## 2021-08-16 DIAGNOSIS — Z11.59 ENCOUNTER FOR SCREENING FOR OTHER VIRAL DISEASES: ICD-10-CM

## 2021-08-16 LAB
ALBUMIN SERPL-MCNC: 3.7 G/DL (ref 3.4–5)
ALP SERPL-CCNC: 61 U/L (ref 40–150)
ALT SERPL W P-5'-P-CCNC: 39 U/L (ref 0–70)
ANION GAP SERPL CALCULATED.3IONS-SCNC: <1 MMOL/L (ref 3–14)
AST SERPL W P-5'-P-CCNC: 29 U/L (ref 0–45)
BASOPHILS # BLD AUTO: 0 10E3/UL (ref 0–0.2)
BASOPHILS NFR BLD AUTO: 1 %
BILIRUB SERPL-MCNC: 0.9 MG/DL (ref 0.2–1.3)
BUN SERPL-MCNC: 12 MG/DL (ref 7–30)
CALCIUM SERPL-MCNC: 9.3 MG/DL (ref 8.5–10.1)
CHLORIDE BLD-SCNC: 106 MMOL/L (ref 94–109)
CO2 SERPL-SCNC: 30 MMOL/L (ref 20–32)
CREAT SERPL-MCNC: 1.25 MG/DL (ref 0.66–1.25)
EOSINOPHIL # BLD AUTO: 0 10E3/UL (ref 0–0.7)
EOSINOPHIL NFR BLD AUTO: 0 %
ERYTHROCYTE [DISTWIDTH] IN BLOOD BY AUTOMATED COUNT: 12.5 % (ref 10–15)
ERYTHROCYTE [SEDIMENTATION RATE] IN BLOOD BY WESTERGREN METHOD: 14 MM/HR (ref 0–20)
GFR SERPL CREATININE-BSD FRML MDRD: 66 ML/MIN/1.73M2
GLUCOSE BLD-MCNC: 105 MG/DL (ref 70–99)
HCT VFR BLD AUTO: 43.6 % (ref 40–53)
HGB BLD-MCNC: 14.1 G/DL (ref 13.3–17.7)
IMM GRANULOCYTES # BLD: 0 10E3/UL
IMM GRANULOCYTES NFR BLD: 0 %
LYMPHOCYTES # BLD AUTO: 1.7 10E3/UL (ref 0.8–5.3)
LYMPHOCYTES NFR BLD AUTO: 40 %
MCH RBC QN AUTO: 30.3 PG (ref 26.5–33)
MCHC RBC AUTO-ENTMCNC: 32.3 G/DL (ref 31.5–36.5)
MCV RBC AUTO: 94 FL (ref 78–100)
MONOCYTES # BLD AUTO: 0.5 10E3/UL (ref 0–1.3)
MONOCYTES NFR BLD AUTO: 12 %
NEUTROPHILS # BLD AUTO: 2 10E3/UL (ref 1.6–8.3)
NEUTROPHILS NFR BLD AUTO: 47 %
NRBC # BLD AUTO: 0 10E3/UL
NRBC BLD AUTO-RTO: 0 /100
PLATELET # BLD AUTO: 141 10E3/UL (ref 150–450)
POTASSIUM BLD-SCNC: 4.6 MMOL/L (ref 3.4–5.3)
PROT SERPL-MCNC: 7.7 G/DL (ref 6.8–8.8)
RBC # BLD AUTO: 4.66 10E6/UL (ref 4.4–5.9)
RBC MORPH BLD: NORMAL
SODIUM SERPL-SCNC: 136 MMOL/L (ref 133–144)
TSH SERPL DL<=0.005 MIU/L-ACNC: 0.78 MU/L (ref 0.4–4)
WBC # BLD AUTO: 4.3 10E3/UL (ref 4–11)

## 2021-08-16 PROCEDURE — 99214 OFFICE O/P EST MOD 30 MIN: CPT | Performed by: PHYSICIAN ASSISTANT

## 2021-08-16 PROCEDURE — 86617 LYME DISEASE ANTIBODY: CPT | Mod: 90 | Performed by: PHYSICIAN ASSISTANT

## 2021-08-16 PROCEDURE — 80050 GENERAL HEALTH PANEL: CPT | Performed by: PHYSICIAN ASSISTANT

## 2021-08-16 PROCEDURE — 86618 LYME DISEASE ANTIBODY: CPT | Performed by: PHYSICIAN ASSISTANT

## 2021-08-16 PROCEDURE — U0005 INFEC AGEN DETEC AMPLI PROBE: HCPCS | Performed by: PHYSICIAN ASSISTANT

## 2021-08-16 PROCEDURE — 36415 COLL VENOUS BLD VENIPUNCTURE: CPT | Performed by: PHYSICIAN ASSISTANT

## 2021-08-16 PROCEDURE — 85652 RBC SED RATE AUTOMATED: CPT | Performed by: PHYSICIAN ASSISTANT

## 2021-08-16 PROCEDURE — 99000 SPECIMEN HANDLING OFFICE-LAB: CPT | Performed by: PHYSICIAN ASSISTANT

## 2021-08-16 PROCEDURE — U0003 INFECTIOUS AGENT DETECTION BY NUCLEIC ACID (DNA OR RNA); SEVERE ACUTE RESPIRATORY SYNDROME CORONAVIRUS 2 (SARS-COV-2) (CORONAVIRUS DISEASE [COVID-19]), AMPLIFIED PROBE TECHNIQUE, MAKING USE OF HIGH THROUGHPUT TECHNOLOGIES AS DESCRIBED BY CMS-2020-01-R: HCPCS | Performed by: PHYSICIAN ASSISTANT

## 2021-08-16 NOTE — PROGRESS NOTES
Assessment & Plan     Fatigue, unspecified type  Fever, unspecified fever cause  Body aches  Encounter for screening for other viral diseases  Ongoing fatigue with body aches and intermittent fevers with night sweats since end of July 2021. Does report history of deer tick bite end of July but does not think was lodged for long period of time, estimates less than 24 hours. Will check labs today. Differential includes lyme, breakthrough COVID, thyroid, metabolic, post-viral, malignant. Opted not to check rheumatologic labs today as no joints are bothersome, could consider in the future if ongoing or worsening symptoms.   Addendum: add on order for peripheral smear after  noted abnormal blasts and lymphocytes on CBC.   - Lyme Disease Nayeli with reflex to WB Serum; Future  - CBC with platelets and differential; Future  - Comprehensive metabolic panel (BMP + Alb, Alk Phos, ALT, AST, Total. Bili, TP); Future  - TSH with free T4 reflex; Future  - Symptomatic COVID-19 Virus (Coronavirus) by PCR; Future  - ESR: Erythrocyte sedimentation rate; Future  - ESR: Erythrocyte sedimentation rate  - TSH with free T4 reflex  - Comprehensive metabolic panel (BMP + Alb, Alk Phos, ALT, AST, Total. Bili, TP)  - CBC with platelets and differential  - Lyme Disease Nayeli with reflex to WB Serum    Return for if not improving or worsening.    Sergey Hardwick PA-C  [facility]    Mayur Felix is a 51 year old who presents for the following health issues     HPI   Insect/bug bite-     Onset: end of July      Description:        Type of insect: deer tick        Location of bite: left thigh, and some in the groin area but he did not see the bug or a bite     Accompanying Signs & Symptoms:        Itching: YES              Redness: no        Warmth: no        Swelling: no        Pain: achy joints and muscles         Drainage: no        Fever: YES        Chest Pain: no        Shortness of breath: no                               History of allergic reactions:    Anaphylaxis: no  Hives: no       If so, did you have/use an epi-pen:  no    Therapies tried and outcome: asprin for the fever, advil prn for aches  with minor relief    Experienced fever, aches, chills, and headache at the end of July for about 4 days   Still with ongoing fatigue since then   This past weekend was doing some hard work in his yard and felt like energy was way down  Took temp last night and had a fever again, reports temp to 101   Also with some night sweats   Did pull off a deer tick in June (does not think it bit him) and again end of July -- this one did bite him   Does not think the deer tick was lodged for a long period of time   No rash  Just an itchy little bump at site of bite  Typically spends a lot of time outside  Typically rides bicycle everywhere, finds he is having to take everything slow, more fatigued   Had COVID saliva test 7/31/21 and returned negative   No one else he knows with similar symptoms   No history of anemia   No family history of autoimmune syndromes   He denies any specific joint pains, more generalized aches   No history of gout   No family history of thyroid problems   Has been taking Advil  Eats a normal diet  No dark tarry stools, no diarrhea, no hematochezia  No unexplained weight loss   Sleeps pretty well at night  At baseline Isidro is very active  Denies depressive symptoms. Stress has been a little higher with mother having mild dementia and father-in-law in the hospital (a few strokes).      Review of Systems   Constitutional, HEENT, cardiovascular, pulmonary, gi and gu systems are negative, except as otherwise noted.      Objective    /77 (BP Location: Left arm, Patient Position: Sitting, Cuff Size: Adult Regular)   Pulse 85   Temp 97.8  F (36.6  C) (Tympanic)   SpO2 96%   There is no height or weight on file to calculate BMI.  Physical Exam  Vitals and nursing note reviewed.   Constitutional:        Appearance: Normal appearance.   HENT:      Head: Normocephalic and atraumatic.      Right Ear: Tympanic membrane, ear canal and external ear normal.      Left Ear: Tympanic membrane, ear canal and external ear normal.      Mouth/Throat:      Mouth: Mucous membranes are moist.      Pharynx: No posterior oropharyngeal erythema.   Eyes:      Conjunctiva/sclera: Conjunctivae normal.   Cardiovascular:      Rate and Rhythm: Normal rate and regular rhythm.      Heart sounds: Normal heart sounds.   Pulmonary:      Effort: Pulmonary effort is normal.      Breath sounds: Normal breath sounds.   Abdominal:      General: Abdomen is flat. Bowel sounds are normal.      Palpations: Abdomen is soft.      Tenderness: There is no abdominal tenderness. There is no right CVA tenderness or left CVA tenderness.   Musculoskeletal:         General: No tenderness. Normal range of motion.      Cervical back: Neck supple. No rigidity or tenderness.   Lymphadenopathy:      Cervical: No cervical adenopathy.   Skin:     General: Skin is warm and dry.   Neurological:      General: No focal deficit present.      Mental Status: He is alert.      Sensory: No sensory deficit.      Motor: No weakness.   Psychiatric:         Mood and Affect: Mood normal.         Behavior: Behavior normal.            Results for orders placed or performed in visit on 08/16/21 (from the past 24 hour(s))   ESR: Erythrocyte sedimentation rate   Result Value Ref Range    Erythrocyte Sedimentation Rate 14 0 - 20 mm/hr   CBC with platelets and differential    Narrative    The following orders were created for panel order CBC with platelets and differential.  Procedure                               Abnormality         Status                     ---------                               -----------         ------                     CBC with platelets and d...[[id number]]                      In process                   Please view results for these tests on the individual  orders.

## 2021-08-16 NOTE — PATIENT INSTRUCTIONS
Initiate broad lab workup today  Labs will return gradually over the coming days -- I will let you know results and plan when they are all back  In meantime try to rest, push fluids, and take tylenol and/or ibuprofen as needed for pain and/or fever

## 2021-08-17 LAB — SARS-COV-2 RNA RESP QL NAA+PROBE: NEGATIVE

## 2021-08-18 LAB — B BURGDOR IGG+IGM SER QL: 1.31

## 2021-08-20 LAB
B BURGDOR IGG SER QL IB: NEGATIVE
B BURGDOR IGM SER QL IB: POSITIVE

## 2021-08-20 RX ORDER — DOXYCYCLINE 100 MG/1
100 CAPSULE ORAL 2 TIMES DAILY
Qty: 56 CAPSULE | Refills: 0 | Status: SHIPPED | OUTPATIENT
Start: 2021-08-20 | End: 2021-09-17

## 2021-09-19 ENCOUNTER — HEALTH MAINTENANCE LETTER (OUTPATIENT)
Age: 51
End: 2021-09-19

## 2021-12-08 PROBLEM — S89.90XA KNEE INJURY: Status: RESOLVED | Noted: 2021-03-09 | Resolved: 2021-12-08

## 2022-03-02 ENCOUNTER — ANCILLARY PROCEDURE (OUTPATIENT)
Dept: GENERAL RADIOLOGY | Facility: CLINIC | Age: 52
End: 2022-03-02
Attending: FAMILY MEDICINE
Payer: COMMERCIAL

## 2022-03-02 ENCOUNTER — OFFICE VISIT (OUTPATIENT)
Dept: ORTHOPEDICS | Facility: CLINIC | Age: 52
End: 2022-03-02
Payer: COMMERCIAL

## 2022-03-02 VITALS — WEIGHT: 180 LBS | BODY MASS INDEX: 21.25 KG/M2 | HEIGHT: 77 IN

## 2022-03-02 DIAGNOSIS — M25.562 ACUTE PAIN OF LEFT KNEE: Primary | ICD-10-CM

## 2022-03-02 DIAGNOSIS — M25.562 LEFT KNEE PAIN: ICD-10-CM

## 2022-03-02 PROCEDURE — 73562 X-RAY EXAM OF KNEE 3: CPT | Mod: LT | Performed by: RADIOLOGY

## 2022-03-02 PROCEDURE — 99213 OFFICE O/P EST LOW 20 MIN: CPT | Performed by: FAMILY MEDICINE

## 2022-03-02 NOTE — PROGRESS NOTES
Subjective:   Kevyn Art is a 51 year old male who c/o left knee pain. Pt notes that 1.5 months ago he was walking up stairs and felt a sharp twinge of pain over his proximal patella. Pt also bikes to work and has recently had to bike up hill for a portion of the ride which bothers him. Pt felt another twinge walking up stairs last night.   Pt at the top, feels a bit behind the patella on the left.  Bought a new bike that allows shifting    Background:   Date of injury: NA   Duration of symptoms: 2 months  Mechanism of Injury: Insidious Onset; Unknown   Intensity: 1/10 at rest, 8/10 with activity   Aggravating factors: Walking up stairs, getting out of a chair, biking up hill  Relieving Factors: Rest  Prior Evaluation: None    PAST MEDICAL, SOCIAL, SURGICAL AND FAMILY HISTORY: He  has a past medical history of Atrial fibrillation with RVR (H).  He  has a past surgical history that includes Colonoscopy (N/A, 4/2/2021).  His family history includes Breast Cancer (age of onset: 62) in his mother.  He reports that he has never smoked. He has never used smokeless tobacco. He reports previous alcohol use. He reports that he does not use drugs.    ALLERGIES: He is allergic to other [no clinical screening - see comments], zithromax [azithromycin], and diltiazem.    CURRENT MEDICATIONS: He currently has no medications in their medication list.     REVIEW OF SYSTEMS: 10 point review of systems is negative except as noted above.     Exam:   There were no vitals taken for this visit.           CONSTITUTIONAL: healthy, alert, no distress and cooperative  HEAD: Normocephalic. No masses, lesions, tenderness or abnormalities  SKIN: no suspicious lesions or rashes  GAIT: normal  NEUROLOGIC: Non-focal  PSYCHIATRIC: affect normal/bright and mentation appears normal.    MUSCULOSKELETAL: left knee pain      Inspection:       AP/lateral alignment normal  Tender: quad tendon, suprapatella      Non-tender: patellar facets, MCL,  LCL, lateral joint line, medial joint line, IT band, posterior knee   Active Range of Motion: all normal  Strength: quad  5/5, Hamstrings  5/5, Gastroc  5/5, Tibialis anterior  5/5, Peroneals  5/5 and core strength  5-/5 hip abductors on right and other core muscles   Special tests: normal Valgus stress test, normal Varus, negative Lachman's test,  negative Shaka's, no apprehension with lateral stress of the patella.         Assessment/Plan:   Pt is a 52 yo white male with PMhx of no health issues presenting to discuss suprapatellar left knee pain  1. Left knee pain-suprapatellar enthesophyte, quad tendonitis  Ice  Strengthening with PT exercises  Improved knee pain in the past  Can send to formal PT if not improving  Irritation likely from biking on hill on new route and unable to shift gears  Already has had bike fit    RTC 6 weeks, PRN  RTC 6 weeks or PRN    X-RAY INTERPRETATION:   X-Ray of the Left Knee: 3-view, Melchor, lateral, sunrise   ordered and interpreted in the office today was positive for suprapatellar enthesophyte

## 2022-03-02 NOTE — LETTER
3/2/2022      RE: Kevyn Art  [address] [facility] [id number]        Subjective:   Kevyn Art is a 51 year old male who c/o left knee pain. Pt notes that 1.5 months ago he was walking up stairs and felt a sharp twinge of pain over his proximal patella. Pt also bikes to work and has recently had to bike up hill for a portion of the ride which bothers him. Pt felt another twinge walking up stairs last night.   Pt at the top, feels a bit behind the patella on the left.  Bought a new bike that allows shifting    Background:   Date of injury: NA   Duration of symptoms: 2 months  Mechanism of Injury: Insidious Onset; Unknown   Intensity: 1/10 at rest, 8/10 with activity   Aggravating factors: Walking up stairs, getting out of a chair, biking up hill  Relieving Factors: Rest  Prior Evaluation: None    PAST MEDICAL, SOCIAL, SURGICAL AND FAMILY HISTORY: He  has a past medical history of Atrial fibrillation with RVR (H).  He  has a past surgical history that includes Colonoscopy (N/A, 4/2/2021).  His family history includes Breast Cancer (age of onset: 62) in his mother.  He reports that he has never smoked. He has never used smokeless tobacco. He reports previous alcohol use. He reports that he does not use drugs.    ALLERGIES: He is allergic to other [no clinical screening - see comments], zithromax [azithromycin], and diltiazem.    CURRENT MEDICATIONS: He currently has no medications in their medication list.     REVIEW OF SYSTEMS: 10 point review of systems is negative except as noted above.     Exam:   There were no vitals taken for this visit.           CONSTITUTIONAL: healthy, alert, no distress and cooperative  HEAD: Normocephalic. No masses, lesions, tenderness or abnormalities  SKIN: no suspicious lesions or rashes  GAIT: normal  NEUROLOGIC: Non-focal  PSYCHIATRIC: affect normal/bright and mentation appears normal.    MUSCULOSKELETAL: left knee pain      Inspection:       AP/lateral  alignment normal  Tender: quad tendon, suprapatella      Non-tender: patellar facets, MCL, LCL, lateral joint line, medial joint line, IT band, posterior knee   Active Range of Motion: all normal  Strength: quad  5/5, Hamstrings  5/5, Gastroc  5/5, Tibialis anterior  5/5, Peroneals  5/5 and core strength  5-/5 hip abductors on right and other core muscles   Special tests: normal Valgus stress test, normal Varus, negative Lachman's test,  negative Shaka's, no apprehension with lateral stress of the patella.         Assessment/Plan:   Pt is a 50 yo white male with PMhx of no health issues presenting to discuss suprapatellar left knee pain  1. Left knee pain-suprapatellar enthesophyte, quad tendonitis  Ice  Strengthening with PT exercises  Improved knee pain in the past  Can send to formal PT if not improving  Irritation likely from biking on hill on new route and unable to shift gears  Already has had bike fit    RTC 6 weeks, PRN  RTC 6 weeks or PRN    X-RAY INTERPRETATION:   X-Ray of the Left Knee: 3-view, Melchor, lateral, sunrise   ordered and interpreted in the office today was positive for suprapatellar enthesophyte      Vicki Abarca MD

## 2022-05-01 ENCOUNTER — HEALTH MAINTENANCE LETTER (OUTPATIENT)
Age: 52
End: 2022-05-01

## 2022-11-20 ENCOUNTER — HEALTH MAINTENANCE LETTER (OUTPATIENT)
Age: 52
End: 2022-11-20

## 2022-12-22 ENCOUNTER — NURSE TRIAGE (OUTPATIENT)
Dept: NURSING | Facility: CLINIC | Age: 52
End: 2022-12-22

## 2022-12-22 NOTE — TELEPHONE ENCOUNTER
CC:  Maria E Sarmiento is here today for US results.    Medications: medications verified and updated  Refills needed today?  NO  denies Latex allergy or sensitivity  Tobacco history: verified    PMD - Colette Booker MD    Occupation:  retail  Per verbal order from md, pap order placed.    Patient would like communication of their results via:    [facility]    Patient's current [facility] status: Active.  Health Maintenance   Topic Date Due   • Influenza Vaccine (1) 09/01/2016   • Td/TDaP Vaccine  01/07/2018   • Cervical Cancer Screening  11/17/2018   • HPV (Female) Vaccine  Completed        Per CDC guidelines, ok to keep appointment as it will be day 7 of symptoms-as long as he is fever free for 24hrs (without tylenol/NSAIDs) and symptoms are improving. Recommend he wear N95 mask to appt.     Ok to reschedule too, if patient prefers.    ~Dr. Chavez      See below for CDC recommendations:    _______________    When you have COVID-19, isolation is counted in days, as follows:    If you had symptoms    Day 0 of isolation is the day of symptom onset, regardless of when you tested positive    Day 1 is the first full day after the day your symptoms started    If you test positive for COVID-19, stay home for at least 5 days and isolate from others in your home.  You are likely most infectious during these first 5 days.    Wear a high-quality mask if you must be around others at home and in public.    Do not go places where you are unable to wear a mask. For travel guidance, see CDC s Travel webpage.    Do not travel.    Stay home and separate from others as much as possible.    Use a separate bathroom, if possible.    Take steps to improve ventilation at home, if possible.    Don t share personal household items, like cups, towels, and utensils.    Monitor your symptoms. If you have an emergency warning sign (like trouble breathing), seek emergency medical care immediately.    Learn more about what to do if you have COVID-19.    End isolation based on how serious your COVID-19 symptoms were. Loss of taste and smell may persist for weeks or months after recovery and need not delay the end of isolation.    Your symptoms are improving  You may end isolation after day 5 if:    You are fever-free for 24 hours (without the use of fever-reducing medication).    Your symptoms are not improving  Continue to isolate until:    You are fever-free for 24 hours (without the use of fever-reducing medication).    Your symptoms are improving.

## 2022-12-22 NOTE — TELEPHONE ENCOUNTER
Patient is calling.    Tested positive for COVID 12/21/2022 but symptoms started on 12/20/2022.  He has an upcoming appt for a physician on Tuesday, 12/27/2022. This will be his day 7 of symptoms.  Feeling better already and stated that symptoms are mild.  He is wondering if he can still come in for his physician on Tuesday, wearing a mask, or should it be rescheduled.    Will route message to care team/provider for follow up and have them call him back.      Cherelle Rene RN  [facility] Nurse Advisor  12/22/2022 at 11:50 AM      Coronavirus (COVID-19) Notification    Caller Name (Patient, parent, daughter/son, grandparent, etc)  Isidro, the patient.    Reason for call  Notify of Positive Coronavirus (COVID-19) lab results, assess symptoms,  review [facility] recommendations    Lab Result    Lab test:  2019-nCoV rRt-PCR or SARS-CoV-2 PCR    Oropharyngeal AND/OR nasopharyngeal swabs is POSITIVE for 2019-nCoV RNA/SARS-COV-2 PCR (COVID-19 virus)    { Gather patient reported symptoms   Assessment   Current Symptoms at time of phone call, reported by patient: (if no symptoms, document: No symptoms] Nasal and sinus congestion, mild cough   Date of symptom(s) onset (if applicable) 12/19/2022     If at time of call, Patients symptoms have worsened, the Patient should contact 911 or have someone drive them to Emergency Dept promptly:      If Patient calling 911, inform 911 personal that you have tested positive for the Coronavirus (COVID-19).  Place mask on and await 911 to arrive.    If Emergency Dept, If possible, please have another adult drive you to the Emergency Dept but you need to wear mask when in contact with other people.      Treatment Options:   Patient classified as COVID treatment eligible by Epic high risk algorithm: No    Review information with Patient    Your result was positive. This means you have COVID-19 (coronavirus).    How can I protect others?    These guidelines are for isolating  before returning to work, school or .    If you DO have symptoms    Stay home and away from others     For at least 5 days after your symptoms started, AND    You are fever free for 24 hours (with no medicine that reduces fever), AND    Your other symptoms are better    Wear a mask for 10 full days anytime you are around others    If you DON'T have symptoms    Stay home and away from others for at least 5 days after your positive test    Wear a mask for 10 full days anytime you are around others    There may be different guidelines for healthcare facilities.  Please check with the specific sites before arriving.    If you have been told by a doctor that you were severely ill with COVID-19 or are immunocompromised, you should isolate for at least 10 days.    You should not go back to work until you meet the guidelines above for ending your home isolation. You don't need to be retested for COVID-19 before going back to work--studies show that you won't spread the virus if it's been at least 10 days since your symptoms started (or 20 days, if you have a weak immune system).    Would you like me to review some of that information with you now?  Yes    How can I take care of myself?      Get lots of rest. Drink extra fluids (unless a doctor has told you not to).      Take Tylenol (acetaminophen) for fever or pain. If you have liver or kidney problems, ask your family doctor if it's okay to take Tylenol.     Take either:     650 mg (two 325 mg pills) every 4 to 6 hours, or     1,000 mg (two 500 mg pills) every 8 hours as needed.     Note: Do not take more than 3,000 mg in one day. Acetaminophen is found in many medicines (both prescribed and over-the-counter medicines). Read all labels to be sure you don't take too much.    For children, check the Tylenol bottle for the right dose (based on their age or weight).      If you have other health problems (like cancer, heart failure, an organ transplant or severe kidney  disease): Call your specialty clinic if you don't feel better in the next 2 days.      Know when to call 911: Emergency warning signs include:    Trouble breathing or shortness of breath    Pain or pressure in the chest that doesn't go away    Feeling confused like you haven't felt before, or not being able to wake up    Bluish-colored lips or face        Cherelle Rene RN  [facility] Nurse Advisor  12/22/2022 at 11:46 AM        Reason for Disposition    [1] COVID-19 diagnosed by positive lab test (e.g., PCR, rapid self-test kit) AND [2] mild symptoms (e.g., cough, fever, others) AND [3] no complications or SOB    Additional Information    Negative: SEVERE difficulty breathing (e.g., struggling for each breath, speaks in single words)    Negative: Difficult to awaken or acting confused (e.g., disoriented, slurred speech)    Negative: Bluish (or gray) lips or face now    Negative: Shock suspected (e.g., cold/pale/clammy skin, too weak to stand, low BP, rapid pulse)    Negative: Sounds like a life-threatening emergency to the triager    Negative: [1] Diagnosed or suspected COVID-19 AND [2] symptoms lasting 3 or more weeks    Negative: [1] COVID-19 exposure AND [2] no symptoms    Negative: COVID-19 vaccine reaction suspected (e.g., fever, headache, muscle aches) occurring 1 to 3 days after getting vaccine    Negative: COVID-19 vaccine, questions about    Negative: [1] Lives with someone known to have influenza (flu test positive) AND [2] flu-like symptoms (e.g., cough, runny nose, sore throat, SOB; with or without fever)    Negative: [1] Adult with possible COVID-19 symptoms AND [2] triager concerned about severity of symptoms or other causes    Negative: COVID-19 and breastfeeding, questions about    Negative: SEVERE or constant chest pain or pressure  (Exception: Mild central chest pain, present only when coughing.)    Negative: MODERATE difficulty breathing (e.g., speaks in phrases, SOB even  at rest, pulse 100-120)    Negative: Headache and stiff neck (can't touch chin to chest)    Negative: Oxygen level (e.g., pulse oximetry) 90 percent or lower    Negative: Chest pain or pressure  (Exception: MILD central chest pain, present only when coughing)    Negative: Patient sounds very sick or weak to the triager    Negative: MILD difficulty breathing (e.g., minimal/no SOB at rest, SOB with walking, pulse <100)    Negative: Fever > 103 F (39.4 C)    Negative: [1] Fever > 101 F (38.3 C) AND [2] over 60 years of age    Negative: [1] Fever > 100.0 F (37.8 C) AND [2] bedridden (e.g., nursing home patient, CVA, chronic illness, recovering from surgery)    Negative: [1] HIGH RISK for severe COVID complications (e.g., weak immune system, age > 64 years, obesity with BMI of 30 or higher, pregnant, chronic lung disease or other chronic medical condition) AND [2] COVID symptoms (e.g., cough, fever)  (Exceptions: Already seen by PCP and no new or worsening symptoms.)    Negative: [1] HIGH RISK patient AND [2] influenza is widespread in the community AND [3] ONE OR MORE respiratory symptoms: cough, sore throat, runny or stuffy nose    Negative: [1] HIGH RISK patient AND [2] influenza exposure within the last 7 days AND [3] ONE OR MORE respiratory symptoms: cough, sore throat, runny or stuffy nose    Negative: Oxygen level (e.g., pulse oximetry) 91 to 94 percent    Negative: [1] COVID-19 infection suspected by caller or triager AND [2] mild symptoms (cough, fever, or others) AND [3] negative COVID-19 rapid test    Negative: Fever present > 3 days (72 hours)    Negative: [1] Fever returns after gone for over 24 hours AND [2] symptoms worse or not improved    Negative: [1] Continuous (nonstop) coughing interferes with work or school AND [2] no improvement using cough treatment per Care Advice    Negative: Cough present > 3 weeks    Negative: [1] COVID-19 diagnosed by positive lab test (e.g., PCR, rapid self-test kit) AND [2]  NO symptoms (e.g., cough, fever, others)    Protocols used: CORONAVIRUS (COVID-19) DIAGNOSED OR [id number]

## 2022-12-27 ENCOUNTER — OFFICE VISIT (OUTPATIENT)
Dept: FAMILY MEDICINE | Facility: CLINIC | Age: 52
End: 2022-12-27
Payer: COMMERCIAL

## 2022-12-27 VITALS
DIASTOLIC BLOOD PRESSURE: 85 MMHG | SYSTOLIC BLOOD PRESSURE: 129 MMHG | HEIGHT: 77 IN | RESPIRATION RATE: 16 BRPM | BODY MASS INDEX: 20.66 KG/M2 | WEIGHT: 175 LBS | OXYGEN SATURATION: 96 % | TEMPERATURE: 97.1 F | HEART RATE: 71 BPM

## 2022-12-27 DIAGNOSIS — Z00.00 ROUTINE GENERAL MEDICAL EXAMINATION AT A HEALTH CARE FACILITY: Primary | ICD-10-CM

## 2022-12-27 DIAGNOSIS — Q67.6 PECTUS EXCAVATUM: ICD-10-CM

## 2022-12-27 DIAGNOSIS — Z13.1 SCREENING FOR DIABETES MELLITUS: ICD-10-CM

## 2022-12-27 DIAGNOSIS — Z13.220 SCREENING FOR HYPERLIPIDEMIA: ICD-10-CM

## 2022-12-27 LAB
CHOLEST SERPL-MCNC: 184 MG/DL
FASTING STATUS PATIENT QL REPORTED: NO
GLUCOSE SERPL-MCNC: 89 MG/DL (ref 70–99)
HDLC SERPL-MCNC: 43 MG/DL
LDLC SERPL CALC-MCNC: 110 MG/DL
NONHDLC SERPL-MCNC: 141 MG/DL
TRIGL SERPL-MCNC: 157 MG/DL

## 2022-12-27 PROCEDURE — 80061 LIPID PANEL: CPT | Performed by: FAMILY MEDICINE

## 2022-12-27 PROCEDURE — 82947 ASSAY GLUCOSE BLOOD QUANT: CPT | Performed by: FAMILY MEDICINE

## 2022-12-27 PROCEDURE — 36415 COLL VENOUS BLD VENIPUNCTURE: CPT | Performed by: FAMILY MEDICINE

## 2022-12-27 PROCEDURE — 99396 PREV VISIT EST AGE 40-64: CPT | Performed by: FAMILY MEDICINE

## 2022-12-27 ASSESSMENT — ENCOUNTER SYMPTOMS
CHILLS: 0
PALPITATIONS: 0
EYE PAIN: 0
HEMATOCHEZIA: 0
HEMATURIA: 0
PARESTHESIAS: 0
FEVER: 0
DYSURIA: 0
DIZZINESS: 0
JOINT SWELLING: 0
MYALGIAS: 0
WEAKNESS: 0
CONSTIPATION: 0
ARTHRALGIAS: 1
FREQUENCY: 0
SORE THROAT: 0
SHORTNESS OF BREATH: 0
DIARRHEA: 0
NERVOUS/ANXIOUS: 0
HEARTBURN: 0
NAUSEA: 0
HEADACHES: 0
COUGH: 0
ABDOMINAL PAIN: 0

## 2022-12-27 ASSESSMENT — PAIN SCALES - GENERAL: PAINLEVEL: NO PAIN (0)

## 2022-12-27 NOTE — PROGRESS NOTES
SUBJECTIVE:   CC: Isidro is an 52 year old who presents for preventative health visit.   Patient has been advised of split billing requirements and indicates understanding: Yes  Healthy Habits:     Getting at least 3 servings of Calcium per day:  NO    Bi-annual eye exam:  Yes    Dental care twice a year:  Yes    Sleep apnea or symptoms of sleep apnea:  None    Diet:  Regular (no restrictions)    Frequency of exercise:  6-7 days/week    Duration of exercise:  30-45 minutes    Taking medications regularly:  Yes    PHQ-2 Total Score: 0    Today's PHQ-2 Score:   PHQ-2 ( 1999 Pfizer) 12/27/2022   Q1: Little interest or pleasure in doing things 0   Q2: Feeling down, depressed or hopeless 0   PHQ-2 Score 0   PHQ-2 Total Score (12-17 Years)- Positive if 3 or more points; Administer PHQ-A if positive -   Q1: Little interest or pleasure in doing things Not at all   Q2: Feeling down, depressed or hopeless Not at all   PHQ-2 Score 0     Social History     Tobacco Use     Smoking status: Never     Smokeless tobacco: Never   Substance Use Topics     Alcohol use: Not Currently     Comment: Social     If you drink alcohol do you typically have >3 drinks per day or >7 drinks per week? No    Alcohol Use 12/27/2022   Prescreen: >3 drinks/day or >7 drinks/week? No   Prescreen: >3 drinks/day or >7 drinks/week? -     Last PSA:   PSA   Date Value Ref Range Status   02/17/2021 0.60 0 - 4 ug/L Final     Comment:     Assay Method:  Chemiluminescence using Siemens Vista analyzer     Reviewed orders with patient. Reviewed health maintenance and updated orders accordingly - Yes     Reviewed and updated as needed this visit by clinical staff   Tobacco  Allergies  Meds            Reviewed and updated as needed this visit by Provider      Some congestion. Had covid - last Wednesday positive test. Wife was positive before him. He is feeling fine. Biking year around.     Review of Systems   Constitutional: Negative for chills and fever.   HENT:  "Negative for congestion, ear pain, hearing loss and sore throat.    Eyes: Positive for visual disturbance. Negative for pain.   Respiratory: Negative for cough and shortness of breath.    Cardiovascular: Negative for chest pain, palpitations and peripheral edema.   Gastrointestinal: Negative for abdominal pain, constipation, diarrhea, heartburn, hematochezia and nausea.   Genitourinary: Negative for dysuria, frequency, genital sores, hematuria, impotence, penile discharge and urgency.   Musculoskeletal: Positive for arthralgias. Negative for joint swelling and myalgias.   Skin: Negative for rash.   Neurological: Negative for dizziness, weakness, headaches and paresthesias.   Psychiatric/Behavioral: Negative for mood changes. The patient is not nervous/anxious.      OBJECTIVE:   /85 (BP Location: Right arm, Patient Position: Sitting, Cuff Size: Adult Large)   Pulse 71   Temp 97.1  F (36.2  C) (Tympanic)   Resp 16   Ht 1.956 m (6' 5\")   Wt 79.4 kg (175 lb)   SpO2 96%   BMI 20.75 kg/m      Physical Exam  GENERAL: healthy, alert and no distress  EYES: Eyes grossly normal to inspection, PERRL and conjunctivae and sclerae normal  HENT: ear canals and TM's normal, nose and mouth without ulcers or lesions  NECK: no adenopathy, no asymmetry, masses, or scars and thyroid normal to palpation  RESP: lungs clear to auscultation - no rales, rhonchi or wheezes,pectus excavatum  CV: regular rate and rhythm, normal S1 S2, no S3 or S4, no murmur, click or rub, no peripheral edema and peripheral pulses strong,   ABDOMEN: soft, nontender, no hepatosplenomegaly, no masses and bowel sounds normal   (male): normal male genitalia without lesions or urethral discharge, no hernia  MS: no gross musculoskeletal defects noted, no edema  SKIN: no suspicious lesions or rashes  NEURO: Normal strength and tone, mentation intact and speech normal  PSYCH: mentation appears normal, affect normal/bright    ASSESSMENT/PLAN:   (Z00.00) " Routine general medical examination at a health care facility  (primary encounter diagnosis)  Comment:    Plan:      (Z13.1) Screening for diabetes mellitus  Comment:    Plan: Glucose             (Z13.220) Screening for hyperlipidemia  Comment:    Plan: Lipid panel reflex to direct LDL Non-fasting             (Q67.6) Pectus excavatum  Comment:    Plan:  Asymptomatic. No breathing or exercise tolerance issues.           COUNSELING:   Reviewed preventive health counseling, as reflected in patient instructions        He reports that he has never smoked. He has never used smokeless tobacco.         Julio César De La Rosa MD, MD  [facility]

## 2022-12-31 ENCOUNTER — NURSE TRIAGE (OUTPATIENT)
Dept: NURSING | Facility: CLINIC | Age: 52
End: 2022-12-31

## 2023-01-01 NOTE — TELEPHONE ENCOUNTER
[facility]: Dr De La Rosa  Wife and patient came down with COVID, sx began 12/19. He feels he has recovered. His mother has come down with COVID and still has sx. Her sx began 10 days ago. Cousin has not had it  He wants to know if they can celebrate holidays with cousin ?.  Advised to avoid exposure to mother while she is still experiencing sx.     Isabel Xiong RN Triage Nurse Advisor 8:24 PM 12/31/2022    Reason for Disposition    Health Information question, no triage required and triager able to answer question    Additional Information    Negative: [1] Caller is not with the adult (patient) AND [2] reporting urgent symptoms    Negative: Lab result questions    Negative: Medication questions    Negative: Caller can't be reached by phone    Negative: Caller has already spoken to PCP or another triager    Negative: RN needs further essential information from caller in order to complete triage    Negative: Requesting regular office appointment    Negative: [1] Caller requesting NON-URGENT health information AND [2] PCP's office is the best resource    Protocols used: INFORMATION ONLY CALL - NO TRIAGE-[facility]

## 2023-01-23 ENCOUNTER — ALLIED HEALTH/NURSE VISIT (OUTPATIENT)
Dept: FAMILY MEDICINE | Facility: CLINIC | Age: 53
End: 2023-01-23
Payer: COMMERCIAL

## 2023-01-23 DIAGNOSIS — Z23 NEED FOR VACCINATION: Primary | ICD-10-CM

## 2023-01-23 PROCEDURE — 90471 IMMUNIZATION ADMIN: CPT

## 2023-01-23 PROCEDURE — 99207 PR NO CHARGE NURSE ONLY: CPT

## 2023-01-23 PROCEDURE — 90750 HZV VACC RECOMBINANT IM: CPT

## 2023-03-29 ENCOUNTER — ALLIED HEALTH/NURSE VISIT (OUTPATIENT)
Dept: FAMILY MEDICINE | Facility: CLINIC | Age: 53
End: 2023-03-29
Payer: COMMERCIAL

## 2023-03-29 DIAGNOSIS — Z23 NEED FOR VACCINATION: Primary | ICD-10-CM

## 2023-03-29 PROCEDURE — 90750 HZV VACC RECOMBINANT IM: CPT

## 2023-03-29 PROCEDURE — 99207 PR NO CHARGE NURSE ONLY: CPT

## 2023-03-29 PROCEDURE — 90471 IMMUNIZATION ADMIN: CPT

## 2023-03-29 NOTE — NURSING NOTE
Prior to immunization administration, verified patients identity using patient s name and date of birth. Please see Immunization Activity for additional information.     Screening Questionnaire for Adult Immunization    Are you sick today?   No   Do you have allergies to medications, food, a vaccine component or latex?   Yes   Have you ever had a serious reaction after receiving a vaccination?   No   Do you have a long-term health problem with heart, lung, kidney, or metabolic disease (e.g., diabetes), asthma, a blood disorder, no spleen, complement component deficiency, a cochlear implant, or a spinal fluid leak?  Are you on long-term aspirin therapy?   No   Do you have cancer, leukemia, HIV/AIDS, or any other immune system problem?   No   Do you have a parent, brother, or sister with an immune system problem?   No   In the past 3 months, have you taken medications that affect  your immune system, such as prednisone, other steroids, or anticancer drugs; drugs for the treatment of rheumatoid arthritis, Crohn s disease, or psoriasis; or have you had radiation treatments?   No   Have you had a seizure, or a brain or other nervous system problem?   No   During the past year, have you received a transfusion of blood or blood    products, or been given immune (gamma) globulin or antiviral drug?   No   For women: Are you pregnant or is there a chance you could become       pregnant during the next month?   No   Have you received any vaccinations in the past 4 weeks?   No     Immunization questionnaire answers were all negative.    I have reviewed the following standing orders:   This patient is due and qualifies for the Zoster vaccine.    Click here for Zoster Standing Order    I have reviewed the vaccines inclusion and exclusion criteria; There were concerns regarding the following exclusions, shingriz. I have brought them to a provider who approved vaccination.        Injection of shingrix given by Jose Luis Bond MA.  Patient instructed to remain in clinic for 15 minutes afterwards, and to report any adverse reactions.     Screening performed by Jose Luis Bond MA on 3/29/2023 at 3:23 PM.

## 2023-03-29 NOTE — PROGRESS NOTES
{PROVIDER CHARTING PREFERENCE:423611}    Subjective   Isidro is a 52 year old, presenting for the following health issues:  Allied Health Visit  No flowsheet data found.  HPI     {SUPERLIST (Optional):638506}  {additonal problems for provider to add (Optional):211146}      Review of Systems   {ROS COMP (Optional):371162}      Objective    There were no vitals taken for this visit.  There is no height or weight on file to calculate BMI.  Physical Exam   {Exam List (Optional):150059}    {Diagnostic Test Results (Optional):686737}    {AMBULATORY ATTESTATION (Optional):587342}             Otc Regimen: Zeasorb AF powder Detail Level: Simple

## 2023-05-22 ENCOUNTER — TELEPHONE (OUTPATIENT)
Dept: FAMILY MEDICINE | Facility: CLINIC | Age: 53
End: 2023-05-22
Payer: COMMERCIAL

## 2023-05-22 DIAGNOSIS — M79.673 PAIN OF FOOT, UNSPECIFIED LATERALITY: Primary | ICD-10-CM

## 2023-05-22 NOTE — TELEPHONE ENCOUNTER
1- PT reports great toe issue on Right foot. Puncture issue years ago.  Having pain now.    2- Left foot. 4th toe. Scar tissue issue? May need foot xrays.      Seems like podiatry may be the best direction. Pt also brought up this option.    Pended ortho referral.    OK to leave a detailed message.  ANTHONY Islas

## 2023-06-09 ENCOUNTER — OFFICE VISIT (OUTPATIENT)
Dept: PODIATRY | Facility: CLINIC | Age: 53
End: 2023-06-09
Attending: FAMILY MEDICINE
Payer: COMMERCIAL

## 2023-06-09 ENCOUNTER — ANCILLARY PROCEDURE (OUTPATIENT)
Dept: GENERAL RADIOLOGY | Facility: CLINIC | Age: 53
End: 2023-06-09
Attending: PODIATRIST
Payer: COMMERCIAL

## 2023-06-09 VITALS
DIASTOLIC BLOOD PRESSURE: 78 MMHG | HEIGHT: 77 IN | SYSTOLIC BLOOD PRESSURE: 118 MMHG | BODY MASS INDEX: 20.66 KG/M2 | WEIGHT: 175 LBS

## 2023-06-09 DIAGNOSIS — M92.61 HAGLUND'S DEFORMITY, RIGHT: Primary | ICD-10-CM

## 2023-06-09 DIAGNOSIS — M92.61 HAGLUND'S DEFORMITY, RIGHT: ICD-10-CM

## 2023-06-09 DIAGNOSIS — M79.673 PAIN OF FOOT, UNSPECIFIED LATERALITY: ICD-10-CM

## 2023-06-09 PROCEDURE — 99214 OFFICE O/P EST MOD 30 MIN: CPT | Performed by: PODIATRIST

## 2023-06-09 PROCEDURE — 73630 X-RAY EXAM OF FOOT: CPT | Mod: TC | Performed by: RADIOLOGY

## 2023-06-09 NOTE — PATIENT INSTRUCTIONS
Thank you for choosing [facility] Podiatry / Foot & Ankle Surgery!    DR. JOSUE'S CLINIC LOCATIONS:     [facility] (Friday) TRIAGE LINE: [phone] [address] [facility]  APPOINTMENTS: [phone]   [place] [address] RADIOLOGY: [phone]    PHYSICAL THERAPY: [phone]    SET UP SURGERY: [phone]   [place] (Mon-Tues AM-Thurs) BILLING QUESTIONS: [phone]   [address] [address]  [address] FAX: [phone]   [place] [address]        KANG'S NEUROMA   Kang's neuroma is an enlargement or thickening of a nerve in the foot. It is also sometimes referred to as an intermetatarsal neuroma, interdigital neuroma, Kang's metatarsalgia (pain in the metatarsal head area), john-neural fibrosis (scar tissue around a nerve) or entrapment neuropathy (abnormal nerve due to compression). A Kang's neuroma most commonly occurs in the third interspace between the third and fourth toes, followed by the second interspace between the second and third toes. Kang's neuromas have also occurred in the fourth and first interspaces, but these are rare. If you have a Kang's neuroma, there is a 15% chance it will occur bilaterally (on both feet). Kang's neuromas occur most commonly in women who are between 30 to 50 years old. The reason they are more common in women is thought to be due to the shoes women wear.   CAUSES: A Kang's neuroma is thought to be caused by trauma to the nerve, but scientists are still not sure about the exact cause of the trauma. The trauma may be caused by the metatarsal heads, the deep transverse intermetatarsal ligament (holds the metatarsal heads together) or an intermetatarsal bursa (fluid-filled sac). All of these structures can cause compression/trauma on the nerve which initially causes swelling and injury in the nerve. Over time if the compression/trauma continues, the nerve repairs itself with very fibrous tissue that leads to enlargement and thickening of the nerve.  "Other causes of trauma to the nerve may include; overpronation (foot rolls inward), hypermobility (too much motion), cavo varus (high arch foot) and excessive dorsiflexion (toes bend upward) of the toes. These biomechanical (howthe foot moves) factors may cause trauma to the nerve with every step. If the nerve becomes irritated and enlarged then it takes up more space and gets even more compressed and irritated. It becomes a vicious cycle.   SIGNS & SYMPTOMS  - Pain (sharp, stabbing, throbbing, shooting)   - Numbness   - Tingling or \"pins & needles\"   - Burning   - Cramping   - Feeling that you are stepping on something or that something is in your shoe   - Initially the symptoms may happen once in a while, but as the condition gets worse, the symptoms may happen all of the time   - It usually feels better by taking off your shoe and massaging your foot     DIAGNOSIS: Your podiatrist will ask many questions about your signs and symptoms and will perform a physical exam. Some of the exams may include a web space compression test. This is done by squeezing the metatarsals together with one hand and using the thumb and index finger of the other hand to compress the affected web space to reproduce the pain/symptoms. A palpable click (Ruiz's click) is usually present. This test may also cause pain to shoot into the toes and that is called a Tinel's sign. Cristy's test involves squeezing the metatarsals together and moving the toes up and down for 30 seconds. This will usually cause pain or it will bring on your other symptoms. Kidd's sign is positive when you stand and the affected toes spread apart. A Kang's neuroma is usually diagnosed based on the history and physical exam findings, but sometimes other tests such as an x-ray, ultrasound or an MRI are needed.   TREATMENT  1.  Footwear Changes: Wear shoes that are wide and deep in the toe box so they  do not put pressure on your toes and metatarsals. Avoid " wearing high heels because they cause increased pressure on the ball of your foot (forefoot).    2.  Metatarsal Pads: These help to lift and separate the metatarsal heads to take pressure off of the nerve. They are placed just behind where you feel the pain, not on top of the painful spot.   3.  Activity modification: For example, you may try swimming instead of running until your symptoms go away.   4.  Taping   5.  Icing   6.  NSAIDs (anti-inflammatories): aleve, ibuprofen, etc.   7.  Arch Supports or Orthotics: These help to control some of the abnormal motion in your feet. The abnormal motion can lead to extra torque and pressure on the nerve.   8.  Physical Therapy  9.  Cortisone Injection: Helps to decrease the size of the irritated, enlarged nerve.   10.  Sclerosing Alcohol Injection: Helps to destroy the nerve chemically, which causes permanent numbness    SURGERY  If conservative treatment does not help surgery may be needed. Surgery may involve cutting out the nerve or cutting the intermetatarsalligament. Studies have shown surgery has an 80-85% success rate.  Will result in numbness    PREVENTION  -Avoid wearing narrow, pointed toe shoes, or high heels      ALMA'S DEFORMITY  Alma s deformity is a bony enlargement on the back of the heel. The soft tissue near the Achilles tendon becomes irritated when the bony enlargement rubs against shoes. This often leads to painful bursitis, which is an inflammation of the bursa (a fluid-filled sac between the tendon and bone).  CAUSES  Alma s deformity is often called  pump bump  because the rigid backs of pump-style shoes can create pressure that aggravates the enlargement when walking. In fact, any shoes with a rigid back, such as ice skates, men s dress shoes or women s pumps, can cause this irritation.  To some extent, heredity plays a role in Alma s deformity. Inherited foot structures that can make one prone to developing this condition include:  A  high-arched foot   A tight Achilles tendon   A tendency to walk on the outside of the heel.  SYMPTOMS  Alma s deformity can occur in one or both feet. The symptoms include:  A noticeable bump on the back of the heel   Pain in the area where the Achilles tendon attaches to the heel   Swelling in the back of the heel   Redness near the inflamed tissue  DIAGNOSIS  After evaluating the patient s symptoms, the foot and ankle surgeon will examine the foot. In addition, x-rays will be ordered to help the surgeon evaluate the structure of the heel bone.  TREATMENT OPTIONS  Nonsurgical treatment of Alma s deformity is aimed at reducing the inflammation of the bursa. While these approaches can resolve the pain and inflammation, they will not shrink the bony protrusion. Nonsurgical treatment can include one or more of the following:  Medication. Oral nonsteroidal anti-inflammatory drugs (NSAIDs), such as ibuprofen, may be recommended to reduce the pain and inflammation. Ice. To reduce swelling, apply an ice pack to the inflamed area, placing a thin towel between the ice and the skin. Use ice for 20 minutes and then wait at least 40 minutes before icing again.   Exercises. Stretching exercises help relieve tension from the Achilles tendon. These exercises are especially important for the patient who has a tight heel cord.   Heel lifts. Patients with high arches may find that heel lifts placed inside the shoe decrease the pressure on the heel.   Heel pads. Pads placed inside the shoe cushion the heel and may help reduce irritation when walking.   Shoe modification. Backless or soft backed shoes help avoid or minimize irritation.   Physical therapy. Physical therapy modalities, such as ultrasound, can help to reduce inflammation.   Orthotic devices. Custom arch supports control the motion in the foot.   Immobilization. In some cases, casting may be necessary.  SURGERY  If nonsurgical treatment fails to provide adequate pain  "relief, surgery may be needed. The foot and ankle surgeon will determine the procedure that is best suited to your case. It is important to follow the surgeon s instructions for postsurgical care.  PREVENTION  To help prevent a recurrence of Alma s deformity:  wear appropriate shoes; avoid shoes with a rigid heel back   use arch supports or orthotic devices   perform stretching exercises to prevent the Achilles tendon from tightening   avoid running on hard surfaces and running uphill      PRICE Therapy    Many aches and pains throughout the foot and ankle can be helped with many simple treatments.  This is usually described as PRICE Therapy.      P - Protection - often times, inflammation/pain in the lower extremity is not able to improve simply because the areas involved are never allowed to rest.  Every step we take can bother the problematic area.  Protecting those areas is an important step in the healing process.  This may involve a walking cast boot, a special insert/orthotic device, an ankle brace, or simply avoiding barefoot walking.    R - Rest - in addition to protecting the foot/ankle, resting is an important, but often times difficult, treatment option.  Getting off your feet when they bother you, and specifically avoiding activities that cause pain/discomfort, are very beneficial to prevent, and treat, foot/ankle pain.  \"If there's something that makes it hurt(eg activities, shoe gear), and you keep doing the thing that makes it hurt, it's just going to keep hurting\".      I - Ice - icing regularly can help to decrease inflammation and swelling in the foot, thus decreasing pain.  Using an ice pack or a bag of frozen peas works very well.  Ice for 20 minutes multiple times per day as needed.  Do not place the ice directly on the skin as this can cause tissue damage.    C - Compression - using a compression wrap or an ACE wrap can help to decrease swelling, which can help to decrease pain.  Wearing " the wraps is generally not needed at night, but they should be worn on a regular basis when you are going to be on your feet for prolonged periods as gravity tends to pull fluids down to your feet/ankles.    E - Elevation - elevating your lower extremities multiple times daily for 15-20 minutes can help to decrease swelling, which works well in decreasing pain levels.      NSAID/Tylenol - An anti-inflammatory, like Aleve or ibuprofen, and/or a pain medication, such as Tylenol, can help to improve pain levels and get the issue resolved sooner rather than later.  Also, topical anti-inflammatory medications like Voltaren gel can be used for local treatment, with the benefit of avoiding system issues with oral medications.  Anyone with liver issues should be careful with Tylenol, and anyone with high blood pressure or heart, stomach or kidney issues should be careful with anti-inflammatories.  Please ask if you have questions about these medications, including dosage.

## 2023-06-09 NOTE — PROGRESS NOTES
"Foot & Ankle Surgery  June 9, 2023    CC: \"Sort of toe, heel procedure) and middle of foot (left)\"    I was asked to see Kevyn Art regarding the chief complaint by:  Dr. MAIDA De La Rosa    HPI:  Pt is a 52 year old male who presents with above complaint.  Multiple year history of bilateral lower extremity pain.  Patient stepped on a thorn about 15 years ago\" right great toe and states that he now occasionally gets infections of toe.  This was a very large thorn that he stepped on a camping without shoes on.  \"The joint is frozen\" pointing to the middle of the right great toe.  He puts antibiotic ointment on this for the occasional infections and the infection goes away.  There are no open wounds or drainage, even with the infections.  He puts the antibiotic ointment on \"where it is most sore\".  He has mentions left forefoot pain, \"feels like a nerve thing\".  \"I do sun dances\" and the left 4th toe \"Gets pretty numb\".  \"the 3rd issue\" is the right heel, where he has concerns about a bone spur.  States the area can get red and swollen.      ROS:   Pos for CC.  The patient denies current nausea, vomiting, chills, fevers, belly pain, calf pain, chest pain or SOB.  Complete remainder of ROS is otherwise neg.    VITALS:    Vitals:    06/09/23 0753   Weight: 79.4 kg (175 lb)   Height: 1.956 m (6' 5\")       PMH:    Past Medical History:   Diagnosis Date     Atrial fibrillation with RVR (H)        SXHX:    Past Surgical History:   Procedure Laterality Date     COLONOSCOPY N/A 4/2/2021    Procedure: COLONOSCOPY;  Surgeon: Keith Riggins MD;  [facility]    No current outpatient medications on file.     No current facility-administered medications for this visit.       ALL:     Allergies   Allergen Reactions     Other [No Clinical Screening - See Comments]      Hay Fever      Zithromax [Azithromycin]      depression     Diltiazem Rash       FMH:    Family History   Problem Relation Age of Onset "     Breast Cancer Mother 62       SocHx:    Social History     Socioeconomic History     Marital status:      Spouse name: Not on file     Number of children: Not on file     Years of education: Not on file     Highest education level: Not on file   Occupational History     Not on file   Tobacco Use     Smoking status: Never     Smokeless tobacco: Never   Vaping Use     Vaping status: Never Used   Substance and Sexual Activity     Alcohol use: Not Currently     Comment: Social     Drug use: No     Sexual activity: Yes     Partners: Female   Other Topics Concern     Parent/sibling w/ CABG, MI or angioplasty before 65F 55M? No   Social History Narrative     Not on file     Social Determinants of Health     Financial Resource Strain: Not on file   Food Insecurity: Not on file   Transportation Needs: Not on file   Physical Activity: Not on file   Stress: Not on file   Social Connections: Not on file   Intimate Partner Violence: Not on file   Housing Stability: Not on file           EXAMINATION:  Gen:   No apparent distress  Neuro:   A&Ox3, no deficits  Psych:    Answering questions appropriately for age and situation with normal affect  Head:    NCAT  Eye:    Visual scanning without deficit  Ear:    Response to auditory stimuli wnl  Lung:    Non-labored breathing on RA noted  Abd:    NTND per patient report  Lymph:    Neg for pitting/non-pitting edema BLE  Vasc:    Pulses palpable, CFT minimally delayed  Neuro:    Light touch sensation intact to all sensory nerve distributions without paresthesias  Derm:    No open wounds or SOI R hallux  MSK:    R hallux IPJ with pain/crepitus.  L 3rd interspace pain with + subha sign without adjacent MPJ or met pain.  Alma's deformity right heel, without clear Achilles or insertion pain.  Calf:    Neg for redness, swelling or tenderness      Imaging: X-rays right foot - IMPRESSION: Now healed third proximal phalangeal shaft fracture  deformity. Healed fracture distal  phalangeal base third toe. No acute  right foot fracture or dislocation identified. Diffuse bone  demineralization. Joint spaces are not significantly changed with  degenerative osteoarthritis of mild severity at the first MTP and IP  joints. No radiopaque soft tissue foreign body is seen with specific  attention to the great toe soft tissues. Distal Achilles insertional  enthesophyte.    Assessment:  52 year old male with third interspace neuroma; right Alma's deformity; right hallux IPJ arthritis      Medical Decision Making/Plan:  Discussed etiologies, anatomy and options  1.  Left third interspace neuroma  -I personally reviewed and interpreted the patient's lower extremity history pertinent to today's visit, including imaging/labs, in preparation for initiating a treatment program.  -Our neuroma handout was dispensed  -Conservatively we discussed accommodative shoes, insert (handout dispensed.  The patient was advised to discontinue if this exacerbates symptoms by increasing shoe gear pressure on his foot) and RICE/NSAID versus Tylenol as needed based on pain  -We discussed the option for diagnostic therapeutic steroid injection.  The patient will consider his options  -We briefly discussed the option for surgical excision of the neuroma if work-up demonstrates this is the cause of his pain    2.  Alma's deformity right lower extremity  -I personally reviewed and interpreted the x-ray results.  The patient does have a calcaneal insertion spur but this is clinically asymptomatic and not the area of concern  -Our Alma's deformity handout was dispensed  -Conservatively we discussed accommodative shoes, padding and RICE/NSAID versus Tylenol  -Surgically, we discussed options including bump resection versus Mary and Peg osteotomy  -The patient will consider his options    3.  Right hallux interphalangeal joint arthritis  -I personally reviewed and interpreted today's x-ray results  -Conservatively, we  discussed comfortable shoes and RICE/NSAID versus Tylenol as needed based on pain  -Consider diagnostic/therapeutic intra-articular steroid injection  -When the patient develops his next flare, he will call for an MRI order    Follow up: As needed or sooner with acute issues      Patient's medical history was reviewed today      Brenden Zhang DPM FACFAS FACFAOM  Podiatric Foot & Ankle Surgeon  [facility]  [phone]    Disclaimer: This note consists of symbols derived from keyboarding, dictation and/or voice recognition software. As a result, there may be errors in the script that have gone undetected. Please consider this when interpreting information found in this chart.

## 2023-11-27 ENCOUNTER — PATIENT OUTREACH (OUTPATIENT)
Dept: CARE COORDINATION | Facility: CLINIC | Age: 53
End: 2023-11-27
Payer: COMMERCIAL

## 2023-12-11 ENCOUNTER — PATIENT OUTREACH (OUTPATIENT)
Dept: CARE COORDINATION | Facility: CLINIC | Age: 53
End: 2023-12-11
Payer: COMMERCIAL

## 2024-02-03 ENCOUNTER — HEALTH MAINTENANCE LETTER (OUTPATIENT)
Age: 54
End: 2024-02-03

## 2025-03-02 ENCOUNTER — HEALTH MAINTENANCE LETTER (OUTPATIENT)
Age: 55
End: 2025-03-02

## (undated) DEVICE — SOL WATER IRRIG 500ML BOTTLE 2F7113

## (undated) DEVICE — GOWN IMPERVIOUS 2XL BLUE

## (undated) DEVICE — TUBING SUCTION 12"X1/4" N612

## (undated) DEVICE — SPECIMEN CONTAINER 3OZ W/FORMALIN 59901

## (undated) DEVICE — SUCTION MANIFOLD NEPTUNE 2 SYS 1 PORT 702-025-000

## (undated) RX ORDER — FENTANYL CITRATE 50 UG/ML
INJECTION, SOLUTION INTRAMUSCULAR; INTRAVENOUS
Status: DISPENSED
Start: 2021-04-02

## (undated) RX ORDER — LIDOCAINE HYDROCHLORIDE 20 MG/ML
INJECTION, SOLUTION INFILTRATION; PERINEURAL
Status: DISPENSED
Start: 2017-02-02